# Patient Record
Sex: FEMALE | Race: WHITE | NOT HISPANIC OR LATINO | Employment: FULL TIME | URBAN - METROPOLITAN AREA
[De-identification: names, ages, dates, MRNs, and addresses within clinical notes are randomized per-mention and may not be internally consistent; named-entity substitution may affect disease eponyms.]

---

## 2016-12-31 RX ORDER — METHOCARBAMOL 750 MG/1
TABLET, FILM COATED ORAL
Refills: 0 | COMMUNITY
Start: 2016-12-29 | End: 2017-06-28

## 2016-12-31 RX ORDER — OXYCODONE AND ACETAMINOPHEN 10; 325 MG/1; MG/1
TABLET ORAL
Refills: 0 | COMMUNITY
Start: 2016-10-01 | End: 2017-01-11

## 2017-01-11 ENCOUNTER — LAB (OUTPATIENT)
Dept: LAB | Facility: HOSPITAL | Age: 42
End: 2017-01-11

## 2017-01-11 ENCOUNTER — OFFICE VISIT (OUTPATIENT)
Dept: ONCOLOGY | Facility: CLINIC | Age: 42
End: 2017-01-11

## 2017-01-11 VITALS
HEIGHT: 65 IN | SYSTOLIC BLOOD PRESSURE: 124 MMHG | DIASTOLIC BLOOD PRESSURE: 86 MMHG | BODY MASS INDEX: 26.33 KG/M2 | OXYGEN SATURATION: 100 % | RESPIRATION RATE: 12 BRPM | WEIGHT: 158 LBS | HEART RATE: 72 BPM | TEMPERATURE: 98.8 F

## 2017-01-11 DIAGNOSIS — G25.81 RESTLESS LEG SYNDROME: ICD-10-CM

## 2017-01-11 DIAGNOSIS — D50.0 IRON DEFICIENCY ANEMIA DUE TO CHRONIC BLOOD LOSS: ICD-10-CM

## 2017-01-11 DIAGNOSIS — Z85.3 HISTORY OF RIGHT BREAST CANCER: Primary | ICD-10-CM

## 2017-01-11 DIAGNOSIS — D50.9 IRON DEFICIENCY ANEMIA, UNSPECIFIED IRON DEFICIENCY ANEMIA TYPE: Primary | ICD-10-CM

## 2017-01-11 DIAGNOSIS — G62.0 NEUROPATHY DUE TO CHEMOTHERAPEUTIC DRUG (HCC): ICD-10-CM

## 2017-01-11 DIAGNOSIS — T45.1X5A NEUROPATHY DUE TO CHEMOTHERAPEUTIC DRUG (HCC): ICD-10-CM

## 2017-01-11 LAB
BASOPHILS # BLD AUTO: 0.04 10*3/MM3 (ref 0–0.1)
BASOPHILS NFR BLD AUTO: 0.9 % (ref 0–1.1)
DEPRECATED RDW RBC AUTO: 37.3 FL (ref 37–49)
EOSINOPHIL # BLD AUTO: 0.1 10*3/MM3 (ref 0–0.36)
EOSINOPHIL NFR BLD AUTO: 2.2 % (ref 1–5)
ERYTHROCYTE [DISTWIDTH] IN BLOOD BY AUTOMATED COUNT: 11.7 % (ref 11.7–14.5)
FERRITIN SERPL-MCNC: 430.1 NG/ML (ref 11–207)
HCT VFR BLD AUTO: 33.1 % (ref 34–45)
HGB BLD-MCNC: 10.9 G/DL (ref 11.5–14.9)
IMM GRANULOCYTES # BLD: 0.01 10*3/MM3 (ref 0–0.03)
IMM GRANULOCYTES NFR BLD: 0.2 % (ref 0–0.5)
IRON 24H UR-MRATE: 53 MCG/DL (ref 37–145)
IRON SATN MFR SERPL: 18 % (ref 14–48)
LYMPHOCYTES # BLD AUTO: 1.99 10*3/MM3 (ref 1–3.5)
LYMPHOCYTES NFR BLD AUTO: 43.3 % (ref 20–49)
MCH RBC QN AUTO: 29.3 PG (ref 27–33)
MCHC RBC AUTO-ENTMCNC: 32.9 G/DL (ref 32–35)
MCV RBC AUTO: 89 FL (ref 83–97)
MONOCYTES # BLD AUTO: 0.32 10*3/MM3 (ref 0.25–0.8)
MONOCYTES NFR BLD AUTO: 7 % (ref 4–12)
NEUTROPHILS # BLD AUTO: 2.14 10*3/MM3 (ref 1.5–7)
NEUTROPHILS NFR BLD AUTO: 46.4 % (ref 39–75)
NRBC BLD MANUAL-RTO: 0 /100 WBC (ref 0–0)
PLATELET # BLD AUTO: 187 10*3/MM3 (ref 150–375)
PMV BLD AUTO: 9.5 FL (ref 8.9–12.1)
RBC # BLD AUTO: 3.72 10*6/MM3 (ref 3.9–5)
TIBC SERPL-MCNC: 297 MCG/DL (ref 249–505)
TRANSFERRIN SERPL-MCNC: 212 MG/DL (ref 200–360)
WBC NRBC COR # BLD: 4.6 10*3/MM3 (ref 4–10)

## 2017-01-11 PROCEDURE — 85025 COMPLETE CBC W/AUTO DIFF WBC: CPT | Performed by: INTERNAL MEDICINE

## 2017-01-11 PROCEDURE — 99214 OFFICE O/P EST MOD 30 MIN: CPT | Performed by: INTERNAL MEDICINE

## 2017-01-11 PROCEDURE — 84466 ASSAY OF TRANSFERRIN: CPT | Performed by: INTERNAL MEDICINE

## 2017-01-11 PROCEDURE — 83540 ASSAY OF IRON: CPT | Performed by: INTERNAL MEDICINE

## 2017-01-11 PROCEDURE — 82728 ASSAY OF FERRITIN: CPT | Performed by: INTERNAL MEDICINE

## 2017-01-11 PROCEDURE — 36415 COLL VENOUS BLD VENIPUNCTURE: CPT | Performed by: INTERNAL MEDICINE

## 2017-01-11 NOTE — PROGRESS NOTES
REASONS FOR FOLLOWUP:      1.    History of stage IIB right breast cancer, T2 N1a M0, status post mastectomy and TRAM flap reconstruction. Left prophylactic mastectomy and implant. The patient completed dose-dense Adriamycin/Cytoxan x4, Taxol every 3 x4 in February 2012.  Tamoxifen was i  n  itiated in March 2012.    2. Peripheral neuropathy, sensory, non-motor associated with restless leg syndrom  e and triggered by Taxol treatment. The patient has failed multiple adjuvant therapies for this including Neurontin, Cymbalta and Lyrica. The patient has discontinued Lortab realizing that she is dependent on this medicine and has moved to tramadol 50 mg   twice a day. She has significant restless legs at night according to the patient’  s . Under the present circumstances we measured a B12, folic acid level and a ferritin level because both iron deficiency and B12 deficiency can trigger restless leg syn  drome. The patient will be informed about these reports once they become available. The patient is aware that she continue followup with us every 3 months and doing urine drug screen every 3 months to continue prescription of narcotics. Under no circumsta  nces Lortab will be given to her.     3.   Anemia. The patient has iron deficiency anemia that has been corrected with IV Venofer with 3 Hemoccult's that were negative. The patient’s hemoglobin as per 11/30/2015 has finally improved. Her ferritin level and iron lev  el have normalized. These have no impact whatsoever in her restless leg syndrome, meaning that this condition per se is genetic and not related to iron deficiency. We will continue monitoring her ferritin and iron profile in 2 months and 6 months.     4. Chronic   narcotic use to control restless legs and neuropathic pain.          HISTORY OF PRESENT ILLNESS:  Ms. Griffin returns today to the office for followup.  In the meantime the patient has undergone treatment for pneumonia that  treated her very badly. She had profuse cough, purulent sputum production, shortness of breath, profound fatigue, and unable to work for almost 2 weeks. She continues seeing Dr. Anthony Baez for her neuropathic pain in her lower extremities. He has suggested a neurostimulator. She is not willing to pursue this at this time. She continues receiving tramadol through his office. She remains on her tamoxifen. She has no other symptoms that would suggest breast cancer recurrence. She feels well.               PAST MEDICAL HISTORY:  Rather unremarkable.  She did have a history of right ovarian abscess in 2003 for which she underwent laparoscopic right oophorectomy and salpingectomy.          Otherwise past surgeries include double mastectomy, 07/27/2011, with reconstruction. Status post C-sections x2; one in 1992 and one in 1998.         The patient underwent robotic surgery by Dr. Jermaine Su in June 2013 given the development of abdominal and pelvic pain.  She had a hemorrhagic cyst in   the left ovary that was removed.  She also had bilateral oophorectomy and hysterectomy, not finding any malignancy in the surgical specimens of the uterus, cervix, fallopian tubes, or ovaries.  Given these facts and given the fact as well that the patien  t   has recovered performance status, on 09/20/2013 we advised her to proceed with therapy with Femara at the dose of 2.5 mg a day instead of Tamoxifen.  The patient was advised to initiate a program of walking exercise to minimize bone loss and advised her   to initiate calcium and vitamin D supplementation.  We requested a DEXA scan as well.          OB/GYN HISTORY:  Menarche age 11.  LMP 08/01/11.  She has 7 days of flow which is normal.  She took BCPs briefly several years ago but none recently.  P3, G3.          ONCOLOGIC-HEMATOLOGIC HISTORY: History from previous dates can be found in a separate document.         After being intolerant to oral iron preparation and having a low  iron and low ferritin level the patient received Venofer with improvement in the hemoglobin, iron a  nd TIBC. The patient had normal B12 and normal folic acid levels. Two Hemoccult's were negative for blood.         On 11/30/2015 she was advised to remain no her tamoxifen. She was advised to remain on her narcotic medication at the same dosing to control her neuropathic pain in her feet as a consequence of the previous chemotherapy.         MEDICATIONS: The current medication list was reviewed with the patient and updated in the EMR this date per the Medical Assistant.  Medication dosages and frequencies were confirmed to be accurate.         ALLERGIES:  No known allergies.         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  Denies tobacco or alcohol.  Denies illicit drug use.          FAMILY HISTORY:  Negative for breast or ovarian cancers, negative for other malignancies.  Mother has hypertension.  As of 09/18/2015 the patient’s son, who is a very avid athlete,   16-years-old, has developed atrial fibrillation and has required ablation by the group of pediatric cardiologists at the [facility].         REVIEW OF SYSTEMS:       PAIN:   See VITAL SIGNS below.    GENERAL:    No change in appetite or weight, no fevers, chills or sweats.     SKIN: No rashes or nonhealing lesions.     HEME/LYMPH:  No anemia, easy bruising, bleeding or swollen nodes.     EYES:  No vision changes or diplopia.    ENT:  No tinnitus, hearing loss, gum bleeding, epistaxis, hoarseness or dysphagia.    RESPIRATORY:  No cough, shortness of breath, hemoptysis or wheezing.    CVS:  Occasional palpitations, no orthopnea, dyspnea on exertion or PND.    GI:  No abdominal pain, nausea, vomiting, constipation, diarrhea, melena or hematochezia.     : No dysuria or hematuria.  No abnormal vaginal discharge or bleeding.     MUSCULOSKELETAL:  See HPI.     NEUROLOGICAL: See HPI.     PSYCHIATRIC:  No increased  "nervousness, mood changes or depression.        VITAL SIGNS:    Vitals:    17 1637   BP: 124/86   Pulse: 72   Resp: 12   Temp: 98.8 °F (37.1 °C)   TempSrc: Oral   SpO2: 100%   Weight: 158 lb (71.7 kg)   Height: 64.96\" (165 cm)       PAIN:  3 out of 10      ECO        PHYSICAL EXAMINATION:    GENERAL:  White female in no acute distress.     SKIN:  Warm, dry without rashes, purpura or petechiae.    HEAD:  Normocephalic.    EYES:  Pupils equal, round and reactive to light.  EOMs intact.  Conjunctivae normal.    EARS:  Hearing intact.    NOSE:  Septum midline.  No excoriations or nasal discharge.    MOUTH:  Tongue is well-papillated; no stomatitis or ulcers.  Lips normal.    THROAT:  Oropharynx without lesions or exudates.    NECK:  Supple with good range of motion; no thyromegaly or masses, no JVD or bruits.    LYMPHATICS:  No cervical, supraclavicular, axillary or inguinal adenopathy.    CHEST:  Lungs clear to percussion and auscultation.    CARDIAC:  Regular rate and rhythm without murmurs, rubs or gallops.    ABDOMEN:  Soft, nontender with no organomegaly or masses.    SPINE: Minimal tenderness in the paravertebral muscles in thoracic spine and no soft tissue mass or deformity. No kyphosis.     EXTREMITIES:  No clubbing, cyanosis or edema.    NEUROLOGICAL:  No focal neurological deficits.        LABORATORY DATA:   Auto Differential   Order: [id number] - Part of Panel Order [id number]   Status:  Final result   Visible to patient:  No (Not Released) Dx:  Iron deficiency anemia, unspecified i...      Ref Range & Units 4:28 PM     WBC 4.00 - 10.00 10*3/mm3 4.60   RBC 3.90 - 5.00 10*6/mm3 3.72 (L)   Hemoglobin 11.5 - 14.9 g/dL 10.9 (L)   Hematocrit 34.0 - 45.0 % 33.1 (L)   MCV 83.0 - 97.0 fL 89.0   MCH 27.0 - 33.0 pg 29.3   MCHC 32.0 - 35.0 g/dL 32.9   RDW 11.7 - 14.5 % 11.7   RDW-SD 37.0 - 49.0 fl 37.3   MPV 8.9 - 12.1 fL 9.5   Platelets 150 - 375 10*3/mm3 187   Neutrophil % 39.0 - 75.0 % 46.4   Lymphocyte % 20.0 " - 49.0 % 43.3   Monocyte % 4.0 - 12.0 % 7.0   Eosinophil % 1.0 - 5.0 % 2.2   Basophil % 0.0 - 1.1 % 0.9   Immature Grans % 0.0 - 0.5 % 0.2   Neutrophils, Absolute 1.50 - 7.00 10*3/mm3 2.14   Lymphocytes, Absolute 1.00 - 3.50 10*3/mm3 1.99   Monocytes, Absolute 0.25 - 0.80 10*3/mm3 0.32   Eosinophils, Absolute 0.00 - 0.36 10*3/mm3 0.10   Basophils, Absolute 0.00 - 0.10 10*3/mm3 0.04   Immature Grans, Absolute 0.00 - 0.03 10*3/mm3 0.01   nRBC 0.0 - 0.0 /100 WBC 0.0   Resulting Agency   [facility]      Specimen Collected: 01/11/17  4:28 PM Last Resulted: 01/11/17  4:38 PM                        ASSESSMENT: 1. History of stage IIB right breast cancer, T2 N1a M0, status post mastectomy and TRAM flap reconstruction. Left prophylactic mastectomy and implant. The patient completed dose-dense Adriamycin/Cytoxan x4, Taxol every 3 x4 in February 2012.  Tamoxifen was i  n  itiated in March 2012. So far I find no evidence of cancer recurrence in this patient by clinical examination. Her breast implants remain cosmetically perfect and physically unchanged with no axillary adenopathies or lymphedema in upper extremities. The patient has tolerated tamoxifen well with minimal hot flashes and I find no need for any other intervention from this point of view besides the continuation of tamoxifen 20 mg a day. I find no need for mammograms. The patient will return for this in 6 months.   Peripheral neuropathy, sensory, non-motor associated with restless leg syndrom  e and triggered by Taxol treatment. The patient has failed multiple adjuvant therapies for this including Neurontin, Cymbalta and Lyrica. The patient has discontinued Lortab realizing that she is dependent on this medicine and has moved to tramadol 50 mg   twice a day. She has significant restless legs at night according to the patient’  s . Under the present circumstances we measured a B12, folic acid level and a ferritin level because both iron deficiency and B12  deficiency can trigger restless leg syn  drome. The patient will be informed about these reports once they become available. The patient is aware that she continue followup with us every 3 months and doing urine drug screen every 3 months to continue prescription of narcotics. Under no circumsta  nces Lortab will be given to her.  We advised the patient through multiple narcotic medications and multiple attempts with different medicines including Lyrica, Cymbalta and Neurontin to be seen by Dr. Anthony Baez. He has suggested a neurostimulator implantation. The patient is dubious about the benefit of this and she does not want to proceed in this way. Dr. Baez is providing for her tramadol at this time. CBC is not providing medicine for her under these circumstances.     Anemia. The patient has iron deficiency anemia that has been corrected with IV Venofer with 3 Hemoccult's that were negative. The patient’s hemoglobin as per 11/30/2015 has finally improved. Her ferritin level and iron lev  el have normalized. These have no impact whatsoever in her restless leg syndrome, meaning that this condition per se is genetic and not related to iron deficiency.  On 01/11/2017, the patient has gone through an episode of pneumonia recently and has been treated and now improving. The anemia could be a reflection of that and she has some peripheral lymphocytosis as stated above in the differential of the white count that could be a reflection of the same process. I would like for her to have a CBC repeated in 6 weeks with RN check and otherwise I will review her back in 6 months with a CBC and a CMP. If in 6 weeks from now the anemia persists, will need to further investigate again iron deficiency. The patient is not drinking alcohol. She takes 1 Aleve a day. She has no gastrointestinal symptomatology and she denies any GI bleeding. She has no menstrual flow.

## 2017-02-22 ENCOUNTER — LAB (OUTPATIENT)
Dept: LAB | Facility: HOSPITAL | Age: 42
End: 2017-02-22

## 2017-02-22 ENCOUNTER — CLINICAL SUPPORT (OUTPATIENT)
Dept: ONCOLOGY | Facility: HOSPITAL | Age: 42
End: 2017-02-22

## 2017-02-22 DIAGNOSIS — Z85.3 HISTORY OF RIGHT BREAST CANCER: ICD-10-CM

## 2017-02-22 DIAGNOSIS — C50.911 MALIGNANT NEOPLASM OF RIGHT FEMALE BREAST, UNSPECIFIED SITE OF BREAST: Primary | ICD-10-CM

## 2017-02-22 DIAGNOSIS — D50.0 IRON DEFICIENCY ANEMIA DUE TO CHRONIC BLOOD LOSS: ICD-10-CM

## 2017-02-22 LAB
ALBUMIN SERPL-MCNC: 4.4 G/DL (ref 3.5–5.2)
ALBUMIN/GLOB SERPL: 1.6 G/DL (ref 1.1–2.4)
ALP SERPL-CCNC: 47 U/L (ref 38–116)
ALT SERPL W P-5'-P-CCNC: 10 U/L (ref 0–33)
ANION GAP SERPL CALCULATED.3IONS-SCNC: 14.3 MMOL/L
AST SERPL-CCNC: 17 U/L (ref 0–32)
BASOPHILS # BLD AUTO: 0.06 10*3/MM3 (ref 0–0.1)
BASOPHILS NFR BLD AUTO: 0.8 % (ref 0–1.1)
BILIRUB SERPL-MCNC: 0.2 MG/DL (ref 0.1–1.2)
BUN BLD-MCNC: 14 MG/DL (ref 6–20)
BUN/CREAT SERPL: 13.6 (ref 7.3–30)
CALCIUM SPEC-SCNC: 9.3 MG/DL (ref 8.5–10.2)
CHLORIDE SERPL-SCNC: 99 MMOL/L (ref 98–107)
CO2 SERPL-SCNC: 25.7 MMOL/L (ref 22–29)
CREAT BLD-MCNC: 1.03 MG/DL (ref 0.6–1.1)
DEPRECATED RDW RBC AUTO: 37.3 FL (ref 37–49)
EOSINOPHIL # BLD AUTO: 0.05 10*3/MM3 (ref 0–0.36)
EOSINOPHIL NFR BLD AUTO: 0.7 % (ref 1–5)
ERYTHROCYTE [DISTWIDTH] IN BLOOD BY AUTOMATED COUNT: 12.3 % (ref 11.7–14.5)
GFR SERPL CREATININE-BSD FRML MDRD: 58 ML/MIN/1.73
GLOBULIN UR ELPH-MCNC: 2.7 GM/DL (ref 1.8–3.5)
GLUCOSE BLD-MCNC: 94 MG/DL (ref 74–124)
HCT VFR BLD AUTO: 36.9 % (ref 34–45)
HGB BLD-MCNC: 12.7 G/DL (ref 11.5–14.9)
IMM GRANULOCYTES # BLD: 0.04 10*3/MM3 (ref 0–0.03)
IMM GRANULOCYTES NFR BLD: 0.5 % (ref 0–0.5)
LYMPHOCYTES # BLD AUTO: 2.42 10*3/MM3 (ref 1–3.5)
LYMPHOCYTES NFR BLD AUTO: 32.8 % (ref 20–49)
MCH RBC QN AUTO: 29.1 PG (ref 27–33)
MCHC RBC AUTO-ENTMCNC: 34.4 G/DL (ref 32–35)
MCV RBC AUTO: 84.4 FL (ref 83–97)
MONOCYTES # BLD AUTO: 0.42 10*3/MM3 (ref 0.25–0.8)
MONOCYTES NFR BLD AUTO: 5.7 % (ref 4–12)
NEUTROPHILS # BLD AUTO: 4.39 10*3/MM3 (ref 1.5–7)
NEUTROPHILS NFR BLD AUTO: 59.5 % (ref 39–75)
NRBC BLD MANUAL-RTO: 0 /100 WBC (ref 0–0)
PLATELET # BLD AUTO: 175 10*3/MM3 (ref 150–375)
PMV BLD AUTO: 11 FL (ref 8.9–12.1)
POTASSIUM BLD-SCNC: 4.3 MMOL/L (ref 3.5–4.7)
PROT SERPL-MCNC: 7.1 G/DL (ref 6.3–8)
RBC # BLD AUTO: 4.37 10*6/MM3 (ref 3.9–5)
SODIUM BLD-SCNC: 139 MMOL/L (ref 134–145)
WBC NRBC COR # BLD: 7.38 10*3/MM3 (ref 4–10)

## 2017-02-22 PROCEDURE — 36415 COLL VENOUS BLD VENIPUNCTURE: CPT

## 2017-02-22 PROCEDURE — 85025 COMPLETE CBC W/AUTO DIFF WBC: CPT

## 2017-02-22 PROCEDURE — 36416 COLLJ CAPILLARY BLOOD SPEC: CPT

## 2017-02-22 PROCEDURE — 80053 COMPREHEN METABOLIC PANEL: CPT

## 2017-02-22 RX ORDER — TAMOXIFEN CITRATE 20 MG/1
TABLET ORAL
Qty: 30 TABLET | Refills: 3 | Status: SHIPPED | OUTPATIENT
Start: 2017-02-22 | End: 2017-06-26 | Stop reason: SDUPTHER

## 2017-02-22 NOTE — PROGRESS NOTES
Pt here today for CBC and RN review.  CBC reviewed with pt and copy given to pt.    Hgb today 12.7 which has improved from her last visit on 1/11 when it was 10.9.  Ptls 175, ANC 4.39.  CBC today stable.    Reviewed MD notes from 1/11/17.  Pt stated that in the last week she has been feeling fatigued, nauseated, twitching in arms and legs at times, big toe on each foot has a bruised appearance.    Called Dr Munoz and reviewed information with MD.  Orders received for CMP.  Pt instructed to make appt with primary care MD also.  CMP drawn and explained to pt that results would be back tomorrow and to call back to have results reviewed with pt.  Pt verbalized understanding.

## 2017-02-23 ENCOUNTER — TELEPHONE (OUTPATIENT)
Dept: ONCOLOGY | Facility: HOSPITAL | Age: 42
End: 2017-02-23

## 2017-02-23 NOTE — TELEPHONE ENCOUNTER
----- Message from Megsummer DAMIAN Lares sent at 2/23/2017  1:06 PM EST -----   Pt is calling to get her lab results      [phone]    Reviewed CMP with pt. Informed pt to f/u with PCP per Dr. Munoz instructions. Pt v/u

## 2017-05-23 ENCOUNTER — HOSPITAL ENCOUNTER (OUTPATIENT)
Dept: GENERAL RADIOLOGY | Facility: HOSPITAL | Age: 42
Discharge: HOME | End: 2017-05-23

## 2017-05-23 ENCOUNTER — HOSPITAL ENCOUNTER (OUTPATIENT)
Dept: GENERAL RADIOLOGY | Facility: HOSPITAL | Age: 42
Discharge: HOME | End: 2017-05-23
Admitting: FAMILY MEDICINE

## 2017-05-23 DIAGNOSIS — R60.9 SWELLING: ICD-10-CM

## 2017-05-23 DIAGNOSIS — S90.32XA CONTUSION OF LEFT FOOT: ICD-10-CM

## 2017-05-23 DIAGNOSIS — R52 PAIN: ICD-10-CM

## 2017-05-23 PROCEDURE — 73610 X-RAY EXAM OF ANKLE: CPT

## 2017-05-23 PROCEDURE — 73630 X-RAY EXAM OF FOOT: CPT

## 2017-06-26 RX ORDER — TAMOXIFEN CITRATE 20 MG/1
TABLET ORAL
Qty: 30 TABLET | Refills: 0 | Status: SHIPPED | OUTPATIENT
Start: 2017-06-26 | End: 2017-07-25 | Stop reason: SDUPTHER

## 2017-06-28 ENCOUNTER — LAB (OUTPATIENT)
Dept: LAB | Facility: HOSPITAL | Age: 42
End: 2017-06-28

## 2017-06-28 ENCOUNTER — OFFICE VISIT (OUTPATIENT)
Dept: ONCOLOGY | Facility: CLINIC | Age: 42
End: 2017-06-28

## 2017-06-28 VITALS
DIASTOLIC BLOOD PRESSURE: 78 MMHG | HEART RATE: 84 BPM | SYSTOLIC BLOOD PRESSURE: 138 MMHG | WEIGHT: 151.6 LBS | RESPIRATION RATE: 14 BRPM | HEIGHT: 65 IN | BODY MASS INDEX: 25.26 KG/M2 | TEMPERATURE: 98.2 F | OXYGEN SATURATION: 97 %

## 2017-06-28 DIAGNOSIS — G25.81 RESTLESS LEG SYNDROME: ICD-10-CM

## 2017-06-28 DIAGNOSIS — G62.0 NEUROPATHY DUE TO CHEMOTHERAPEUTIC DRUG (HCC): ICD-10-CM

## 2017-06-28 DIAGNOSIS — D50.0 IRON DEFICIENCY ANEMIA DUE TO CHRONIC BLOOD LOSS: ICD-10-CM

## 2017-06-28 DIAGNOSIS — Z85.3 HISTORY OF RIGHT BREAST CANCER: Primary | ICD-10-CM

## 2017-06-28 DIAGNOSIS — T45.1X5A NEUROPATHY DUE TO CHEMOTHERAPEUTIC DRUG (HCC): ICD-10-CM

## 2017-06-28 LAB
ALBUMIN SERPL-MCNC: 4.4 G/DL (ref 3.5–5.2)
ALBUMIN/GLOB SERPL: 1.4 G/DL (ref 1.1–2.4)
ALP SERPL-CCNC: 64 U/L (ref 38–116)
ALT SERPL W P-5'-P-CCNC: 16 U/L (ref 0–33)
ANION GAP SERPL CALCULATED.3IONS-SCNC: 14.8 MMOL/L
AST SERPL-CCNC: 20 U/L (ref 0–32)
BASOPHILS # BLD AUTO: 0.04 10*3/MM3 (ref 0–0.1)
BASOPHILS NFR BLD AUTO: 0.6 % (ref 0–1.1)
BILIRUB SERPL-MCNC: 0.2 MG/DL (ref 0.1–1.2)
BUN BLD-MCNC: 18 MG/DL (ref 6–20)
BUN/CREAT SERPL: 18.2 (ref 7.3–30)
CALCIUM SPEC-SCNC: 9.3 MG/DL (ref 8.5–10.2)
CHLORIDE SERPL-SCNC: 97 MMOL/L (ref 98–107)
CO2 SERPL-SCNC: 26.2 MMOL/L (ref 22–29)
CREAT BLD-MCNC: 0.99 MG/DL (ref 0.6–1.1)
DEPRECATED RDW RBC AUTO: 39.8 FL (ref 37–49)
EOSINOPHIL # BLD AUTO: 0.11 10*3/MM3 (ref 0–0.36)
EOSINOPHIL NFR BLD AUTO: 1.6 % (ref 1–5)
ERYTHROCYTE [DISTWIDTH] IN BLOOD BY AUTOMATED COUNT: 12.3 % (ref 11.7–14.5)
GFR SERPL CREATININE-BSD FRML MDRD: 61 ML/MIN/1.73
GLOBULIN UR ELPH-MCNC: 3.2 GM/DL (ref 1.8–3.5)
GLUCOSE BLD-MCNC: 91 MG/DL (ref 74–124)
HCT VFR BLD AUTO: 37.1 % (ref 34–45)
HGB BLD-MCNC: 12.3 G/DL (ref 11.5–14.9)
HOLD SPECIMEN: NORMAL
IMM GRANULOCYTES # BLD: 0.02 10*3/MM3 (ref 0–0.03)
IMM GRANULOCYTES NFR BLD: 0.3 % (ref 0–0.5)
LYMPHOCYTES # BLD AUTO: 2.39 10*3/MM3 (ref 1–3.5)
LYMPHOCYTES NFR BLD AUTO: 34.6 % (ref 20–49)
MCH RBC QN AUTO: 29.4 PG (ref 27–33)
MCHC RBC AUTO-ENTMCNC: 33.2 G/DL (ref 32–35)
MCV RBC AUTO: 88.5 FL (ref 83–97)
MONOCYTES # BLD AUTO: 0.29 10*3/MM3 (ref 0.25–0.8)
MONOCYTES NFR BLD AUTO: 4.2 % (ref 4–12)
NEUTROPHILS # BLD AUTO: 4.05 10*3/MM3 (ref 1.5–7)
NEUTROPHILS NFR BLD AUTO: 58.7 % (ref 39–75)
NRBC BLD MANUAL-RTO: 0 /100 WBC (ref 0–0)
PLATELET # BLD AUTO: 234 10*3/MM3 (ref 150–375)
PMV BLD AUTO: 9.3 FL (ref 8.9–12.1)
POTASSIUM BLD-SCNC: 4.2 MMOL/L (ref 3.5–4.7)
PROT SERPL-MCNC: 7.6 G/DL (ref 6.3–8)
RBC # BLD AUTO: 4.19 10*6/MM3 (ref 3.9–5)
SODIUM BLD-SCNC: 138 MMOL/L (ref 134–145)
WBC NRBC COR # BLD: 6.9 10*3/MM3 (ref 4–10)

## 2017-06-28 PROCEDURE — 36415 COLL VENOUS BLD VENIPUNCTURE: CPT | Performed by: INTERNAL MEDICINE

## 2017-06-28 PROCEDURE — 80053 COMPREHEN METABOLIC PANEL: CPT | Performed by: INTERNAL MEDICINE

## 2017-06-28 PROCEDURE — 99213 OFFICE O/P EST LOW 20 MIN: CPT | Performed by: INTERNAL MEDICINE

## 2017-06-28 PROCEDURE — 85025 COMPLETE CBC W/AUTO DIFF WBC: CPT | Performed by: INTERNAL MEDICINE

## 2017-06-28 RX ORDER — OXYCODONE AND ACETAMINOPHEN 10; 325 MG/1; MG/1
TABLET ORAL
Refills: 0 | COMMUNITY
Start: 2017-05-30 | End: 2019-02-14

## 2017-06-28 NOTE — PROGRESS NOTES
REASONS FOR FOLLOWUP:      1.    History of stage IIB right breast cancer, T2 N1a M0, status post mastectomy and TRAM flap reconstruction. Left prophylactic mastectomy and implant. The patient completed dose-dense Adriamycin/Cytoxan x4, Taxol every 3 x4 in February 2012.  Tamoxifen was i  n  itiated in March 2012.    2. Peripheral neuropathy, sensory, non-motor associated with restless leg syndrom  e and triggered by Taxol treatment. The patient has failed multiple adjuvant therapies for this including Neurontin, Cymbalta and Lyrica. The patient has discontinued Lortab realizing that she is dependent on this medicine and has moved to tramadol 50 mg   twice a day. She has significant restless legs at night according to the patient’  s . Under the present circumstances we measured a B12, folic acid level and a ferritin level because both iron deficiency and B12 deficiency can trigger restless leg syn  drome. The patient will be informed about these reports once they become available. The patient is aware that she continue followup with us every 3 months and doing urine drug screen every 3 months to continue prescription of narcotics. Under no circumsta  nces Lortab will be given to her.     3.   Anemia. The patient has iron deficiency anemia that has been corrected with IV Venofer with 3 Hemoccult's that were negative. The patient’s hemoglobin as per 11/30/2015 has finally improved. Her ferritin level and iron lev  el have normalized. These have no impact whatsoever in her restless leg syndrome, meaning that this condition per se is genetic and not related to iron deficiency. We will continue monitoring her ferritin and iron profile in 2 months and 6 months.     4. Chronic   narcotic use to control restless legs and neuropathic pain.          HISTORY OF PRESENT ILLNESS:  Ms. Griffin returns today to the office for followup. . She continues seeing Dr. Anthony Baez for her neuropathic pain in her  lower extremities. He has suggested a neurostimulator. She is not willing to pursue this at this time. She continues receiving tramadol through his office. She remains on her tamoxifen. She has no other symptoms that would suggest breast cancer recurrence. She feels well.               PAST MEDICAL HISTORY:  Rather unremarkable.  She did have a history of right ovarian abscess in 2003 for which she underwent laparoscopic right oophorectomy and salpingectomy.          Otherwise past surgeries include double mastectomy, 07/27/2011, with reconstruction. Status post C-sections x2; one in 1992 and one in 1998.         The patient underwent robotic surgery by Dr. Jermaine Su in June 2013 given the development of abdominal and pelvic pain.  She had a hemorrhagic cyst in   the left ovary that was removed.  She also had bilateral oophorectomy and hysterectomy, not finding any malignancy in the surgical specimens of the uterus, cervix, fallopian tubes, or ovaries.  Given these facts and given the fact as well that the patien  t   has recovered performance status, on 09/20/2013 we advised her to proceed with therapy with Femara at the dose of 2.5 mg a day instead of Tamoxifen.  The patient was advised to initiate a program of walking exercise to minimize bone loss and advised her   to initiate calcium and vitamin D supplementation.  We requested a DEXA scan as well.          OB/GYN HISTORY:  Menarche age 11.  LMP 08/01/11.  She has 7 days of flow which is normal.  She took BCPs briefly several years ago but none recently.  P3, G3.          ONCOLOGIC-HEMATOLOGIC HISTORY: History from previous dates can be found in a separate document.         After being intolerant to oral iron preparation and having a low iron and low ferritin level the patient received Venofer with improvement in the hemoglobin, iron a  nd TIBC. The patient had normal B12 and normal folic acid levels. Two Hemoccult's were negative for blood.         On  "11/30/2015 she was advised to remain no her tamoxifen. She was advised to remain on her narcotic medication at the same dosing to control her neuropathic pain in her feet as a consequence of the previous chemotherapy.         MEDICATIONS: The current medication list was reviewed with the patient and updated in the EMR this date per the Medical Assistant.  Medication dosages and frequencies were confirmed to be accurate.         ALLERGIES:  No known allergies.         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  Denies illicit drug use.          FAMILY HISTORY:  Negative for breast or ovarian cancers, negative for other malignancies.  Mother has hypertension.  As of 09/18/2015 the patient’s son, who is a very avid athlete,   16-years-old, has developed atrial fibrillation and has required ablation by the group of pediatric cardiologists at the [facility].         REVIEW OF SYSTEMS:       PAIN:   See VITAL SIGNS below.    GENERAL:    No change in appetite or weight, no fevers, chills or sweats.     SKIN: No rashes or nonhealing lesions.     HEME/LYMPH:  No anemia, easy bruising, bleeding or swollen nodes.     EYES:  No vision changes or diplopia.    ENT:  No tinnitus, hearing loss, gum bleeding, epistaxis, hoarseness or dysphagia.    RESPIRATORY:  No cough, shortness of breath, hemoptysis or wheezing.    CVS:  Occasional palpitations, no orthopnea, dyspnea on exertion or PND.    GI:  No abdominal pain, nausea, vomiting, constipation, diarrhea, melena or hematochezia.     : No dysuria or hematuria.  No abnormal vaginal discharge or bleeding.     MUSCULOSKELETAL: negative    NEUROLOGICAL: See HPI.     PSYCHIATRIC:  No increased nervousness, mood changes or depression.        VITAL SIGNS:    Vitals:    06/28/17 1625   BP: 138/78   Pulse: 84   Resp: 14   Temp: 98.2 °F (36.8 °C)   SpO2: 97%   Weight: 151 lb 9.6 oz (68.8 kg)   Height: 64.96\" (165 cm) "       PAIN:  3 out of 10      ECO        PHYSICAL EXAMINATION:    GENERAL:  White female in no acute distress.     SKIN:  Warm, dry without rashes, purpura or petechiae.    HEAD:  Normocephalic.    EYES:  Pupils equal, round and reactive to light.  EOMs intact.  Conjunctivae normal.    EARS:  Hearing intact.    NOSE:  Septum midline.  No excoriations or nasal discharge.    MOUTH:  Tongue is well-papillated; no stomatitis or ulcers.  Lips normal.    THROAT:  Oropharynx without lesions or exudates.    NECK:  Supple with good range of motion; no thyromegaly or masses, no JVD or bruits.    LYMPHATICS:  No cervical, supraclavicular, axillary or inguinal adenopathy.    CHEST:  Lungs clear to percussion and auscultation.    CARDIAC:  Regular rate and rhythm without murmurs, rubs or gallops.    ABDOMEN:  Soft, nontender with no organomegaly or masses.    SPINE: Minimal tenderness in the paravertebral muscles in thoracic spine and no soft tissue mass or deformity. No kyphosis.     EXTREMITIES:  No clubbing, cyanosis or edema.    NEUROLOGICAL:  No focal neurological deficits.  Numbness in toes both le.      LABORATORY DATA:   CBC Auto Differential   Order: [id number] - Part of Panel Order [id number]   Status:  Final result   Visible to patient:  No (Not Released) Dx:  History of right breast cancer; Iron ...      Ref Range & Units 4:13 PM     WBC 4.00 - 10.00 10*3/mm3 6.90   RBC 3.90 - 5.00 10*6/mm3 4.19   Hemoglobin 11.5 - 14.9 g/dL 12.3   Hematocrit 34.0 - 45.0 % 37.1   MCV 83.0 - 97.0 fL 88.5   MCH 27.0 - 33.0 pg 29.4   MCHC 32.0 - 35.0 g/dL 33.2   RDW 11.7 - 14.5 % 12.3   RDW-SD 37.0 - 49.0 fl 39.8   MPV 8.9 - 12.1 fL 9.3   Platelets 150 - 375 10*3/mm3 234   Neutrophil % 39.0 - 75.0 % 58.7   Lymphocyte % 20.0 - 49.0 % 34.6   Monocyte % 4.0 - 12.0 % 4.2   Eosinophil % 1.0 - 5.0 % 1.6   Basophil % 0.0 - 1.1 % 0.6   Immature Grans % 0.0 - 0.5 % 0.3   Neutrophils, Absolute 1.50 - 7.00 10*3/mm3 4.05   Lymphocytes, Absolute  1.00 - 3.50 10*3/mm3 2.39   Monocytes, Absolute 0.25 - 0.80 10*3/mm3 0.29   Eosinophils, Absolute 0.00 - 0.36 10*3/mm3 0.11   Basophils, Absolute 0.00 - 0.10 10*3/mm3 0.04   Immature Grans, Absolute 0.00 - 0.03 10*3/mm3 0.02   nRBC 0.0 - 0.0 /100 WBC 0.0   Resulting Agency   CBC LAB      Specimen Collected: 06/28/17  4:13 PM Last Resulted: 06/28/17  4:20 PM                            ASSESSMENT: 1. History of stage IIB right breast cancer, T2 N1a M0, status post mastectomy and TRAM flap reconstruction. Left prophylactic mastectomy and implant. The patient completed dose-dense Adriamycin/Cytoxan x4, Taxol every 3 x4 in February 2012.  Tamoxifen was i  n  itiated in March 2012. So far I find no evidence of cancer recurrence in this patient by clinical examination. Her breast implants remain cosmetically perfect and physically unchanged with no axillary adenopathies or lymphedema in upper extremities. The patient has tolerated tamoxifen well with minimal hot flashes and I find no need for any other intervention from this point of view besides the continuation of tamoxifen 20 mg a day. I find no need for mammograms. The patient will return for this in 6 months.   2.Peripheral neuropathy, sensory, non-motor associated with restless leg syndrom  e and triggered by Taxol treatment. The patient has failed multiple adjuvant therapies for this including Neurontin, Cymbalta and Lyrica. The patient has discontinued Lortab realizing that she is dependent on this medicine and has moved to tramadol 50 mg   twice a day. She has significant restless legs at night according to the patient’  s . Under the present circumstances we measured a B12, folic acid level and a ferritin level because both iron deficiency and B12 deficiency can trigger restless leg syn  drome.   3.  Anemia. The patient has iron deficiency anemia that has been corrected with IV Venofer with 3 Hemoccult's that were negative. The patient’s hemoglobin as  per today has finally improved. Her ferritin level and iron lev  el have normalized. These have no impact whatsoever in her restless leg syndrome, meaning that this condition per se is genetic and not related to iron deficiency.      I will review her back in 6 months, cbc cmp at that time, no need for colonoscopy or pelvic exams at this point.

## 2017-07-25 RX ORDER — TAMOXIFEN CITRATE 20 MG/1
TABLET ORAL
Qty: 30 TABLET | Refills: 5 | Status: SHIPPED | OUTPATIENT
Start: 2017-07-25 | End: 2018-02-01 | Stop reason: SDUPTHER

## 2017-10-23 ENCOUNTER — TELEPHONE (OUTPATIENT)
Dept: ONCOLOGY | Facility: HOSPITAL | Age: 42
End: 2017-10-23

## 2017-10-23 NOTE — TELEPHONE ENCOUNTER
Pt calling to see if we could prescribe her pain medication since she doesn't see Dr Baez until Friday and they are closed today. Informed patient we can not prescribe narcotics for another MD. Pt v/u

## 2017-12-04 ENCOUNTER — APPOINTMENT (OUTPATIENT)
Dept: ONCOLOGY | Facility: CLINIC | Age: 42
End: 2017-12-04

## 2017-12-04 ENCOUNTER — APPOINTMENT (OUTPATIENT)
Dept: LAB | Facility: HOSPITAL | Age: 42
End: 2017-12-04

## 2018-02-01 RX ORDER — TAMOXIFEN CITRATE 20 MG/1
TABLET ORAL
Qty: 30 TABLET | Refills: 0 | Status: SHIPPED | OUTPATIENT
Start: 2018-02-01 | End: 2018-04-16

## 2018-02-01 NOTE — TELEPHONE ENCOUNTER
Pt missed dec appt with Dr. Munoz. Refill request from pharmacy for tamoxifen. Informed pharmacy pt needs a f/u appt to receive future refills

## 2018-02-02 ENCOUNTER — APPOINTMENT (OUTPATIENT)
Dept: ONCOLOGY | Facility: CLINIC | Age: 43
End: 2018-02-02

## 2018-02-26 ENCOUNTER — APPOINTMENT (OUTPATIENT)
Dept: LAB | Facility: HOSPITAL | Age: 43
End: 2018-02-26

## 2018-02-26 ENCOUNTER — APPOINTMENT (OUTPATIENT)
Dept: ONCOLOGY | Facility: CLINIC | Age: 43
End: 2018-02-26

## 2018-03-12 ENCOUNTER — DOCUMENTATION (OUTPATIENT)
Dept: ONCOLOGY | Facility: CLINIC | Age: 43
End: 2018-03-12

## 2018-03-12 NOTE — PROGRESS NOTES
Fax rec from [facility] pharmacy-they are requesting a new rx for pts Tamoxifen. Per 2/1/18 note from Stephanie Sharpe RN-Pt need to see MD before any refills will be given. Pt has appt on 3/16/18. I have noted this on the fax and returned to [facility] [phone]

## 2018-04-16 ENCOUNTER — APPOINTMENT (OUTPATIENT)
Dept: LAB | Facility: HOSPITAL | Age: 43
End: 2018-04-16

## 2018-04-16 ENCOUNTER — OFFICE VISIT (OUTPATIENT)
Dept: ONCOLOGY | Facility: CLINIC | Age: 43
End: 2018-04-16

## 2018-04-16 VITALS
OXYGEN SATURATION: 100 % | HEIGHT: 65 IN | DIASTOLIC BLOOD PRESSURE: 90 MMHG | TEMPERATURE: 99.1 F | RESPIRATION RATE: 16 BRPM | BODY MASS INDEX: 25.92 KG/M2 | SYSTOLIC BLOOD PRESSURE: 124 MMHG | HEART RATE: 81 BPM | WEIGHT: 155.6 LBS

## 2018-04-16 DIAGNOSIS — G62.0 NEUROPATHY DUE TO CHEMOTHERAPEUTIC DRUG (HCC): ICD-10-CM

## 2018-04-16 DIAGNOSIS — D50.0 IRON DEFICIENCY ANEMIA DUE TO CHRONIC BLOOD LOSS: ICD-10-CM

## 2018-04-16 DIAGNOSIS — G25.81 RESTLESS LEG SYNDROME: ICD-10-CM

## 2018-04-16 DIAGNOSIS — T45.1X5A NEUROPATHY DUE TO CHEMOTHERAPEUTIC DRUG (HCC): ICD-10-CM

## 2018-04-16 DIAGNOSIS — Z85.3 HISTORY OF RIGHT BREAST CANCER: Primary | ICD-10-CM

## 2018-04-16 LAB
ALBUMIN SERPL-MCNC: 4.5 G/DL (ref 3.5–5.2)
ALBUMIN/GLOB SERPL: 1.4 G/DL (ref 1.1–2.4)
ALP SERPL-CCNC: 67 U/L (ref 38–116)
ALT SERPL W P-5'-P-CCNC: 15 U/L (ref 0–33)
ANION GAP SERPL CALCULATED.3IONS-SCNC: 14 MMOL/L
AST SERPL-CCNC: 21 U/L (ref 0–32)
BASOPHILS # BLD AUTO: 0.04 10*3/MM3 (ref 0–0.1)
BASOPHILS NFR BLD AUTO: 0.8 % (ref 0–1.1)
BILIRUB SERPL-MCNC: 0.2 MG/DL (ref 0.1–1.2)
BUN BLD-MCNC: 12 MG/DL (ref 6–20)
BUN/CREAT SERPL: 12.6 (ref 7.3–30)
CALCIUM SPEC-SCNC: 9.7 MG/DL (ref 8.5–10.2)
CHLORIDE SERPL-SCNC: 101 MMOL/L (ref 98–107)
CO2 SERPL-SCNC: 26 MMOL/L (ref 22–29)
CREAT BLD-MCNC: 0.95 MG/DL (ref 0.6–1.1)
DEPRECATED RDW RBC AUTO: 39.7 FL (ref 37–49)
EOSINOPHIL # BLD AUTO: 0.17 10*3/MM3 (ref 0–0.36)
EOSINOPHIL NFR BLD AUTO: 3.2 % (ref 1–5)
ERYTHROCYTE [DISTWIDTH] IN BLOOD BY AUTOMATED COUNT: 12.2 % (ref 11.7–14.5)
FERRITIN SERPL-MCNC: 310.6 NG/ML (ref 11–207)
GFR SERPL CREATININE-BSD FRML MDRD: 64 ML/MIN/1.73
GLOBULIN UR ELPH-MCNC: 3.3 GM/DL (ref 1.8–3.5)
GLUCOSE BLD-MCNC: 100 MG/DL (ref 74–124)
HCT VFR BLD AUTO: 37.1 % (ref 34–45)
HGB BLD-MCNC: 12.4 G/DL (ref 11.5–14.9)
HGB RETIC QN: 34.8 PG (ref 29.8–36.1)
IMM GRANULOCYTES # BLD: 0.02 10*3/MM3 (ref 0–0.03)
IMM GRANULOCYTES NFR BLD: 0.4 % (ref 0–0.5)
IMM RETICS NFR: 6.2 % (ref 3–15.8)
IRON 24H UR-MRATE: 66 MCG/DL (ref 37–145)
IRON SATN MFR SERPL: 18 % (ref 14–48)
LYMPHOCYTES # BLD AUTO: 2.1 10*3/MM3 (ref 1–3.5)
LYMPHOCYTES NFR BLD AUTO: 39.5 % (ref 20–49)
MCH RBC QN AUTO: 29.9 PG (ref 27–33)
MCHC RBC AUTO-ENTMCNC: 33.4 G/DL (ref 32–35)
MCV RBC AUTO: 89.4 FL (ref 83–97)
MONOCYTES # BLD AUTO: 0.31 10*3/MM3 (ref 0.25–0.8)
MONOCYTES NFR BLD AUTO: 5.8 % (ref 4–12)
NEUTROPHILS # BLD AUTO: 2.67 10*3/MM3 (ref 1.5–7)
NEUTROPHILS NFR BLD AUTO: 50.3 % (ref 39–75)
NRBC BLD MANUAL-RTO: 0 /100 WBC (ref 0–0)
PLATELET # BLD AUTO: 205 10*3/MM3 (ref 150–375)
PMV BLD AUTO: 9.8 FL (ref 8.9–12.1)
POTASSIUM BLD-SCNC: 4.3 MMOL/L (ref 3.5–4.7)
PROT SERPL-MCNC: 7.8 G/DL (ref 6.3–8)
RBC # BLD AUTO: 4.15 10*6/MM3 (ref 3.9–5)
RETICS/RBC NFR AUTO: 1.11 % (ref 0.6–2)
SODIUM BLD-SCNC: 141 MMOL/L (ref 134–145)
TIBC SERPL-MCNC: 361 MCG/DL (ref 249–505)
TRANSFERRIN SERPL-MCNC: 258 MG/DL (ref 200–360)
WBC NRBC COR # BLD: 5.31 10*3/MM3 (ref 4–10)

## 2018-04-16 PROCEDURE — 85025 COMPLETE CBC W/AUTO DIFF WBC: CPT | Performed by: INTERNAL MEDICINE

## 2018-04-16 PROCEDURE — 83540 ASSAY OF IRON: CPT | Performed by: INTERNAL MEDICINE

## 2018-04-16 PROCEDURE — 85046 RETICYTE/HGB CONCENTRATE: CPT | Performed by: INTERNAL MEDICINE

## 2018-04-16 PROCEDURE — 82728 ASSAY OF FERRITIN: CPT | Performed by: INTERNAL MEDICINE

## 2018-04-16 PROCEDURE — 84466 ASSAY OF TRANSFERRIN: CPT | Performed by: INTERNAL MEDICINE

## 2018-04-16 PROCEDURE — 99213 OFFICE O/P EST LOW 20 MIN: CPT | Performed by: INTERNAL MEDICINE

## 2018-04-16 PROCEDURE — 80053 COMPREHEN METABOLIC PANEL: CPT | Performed by: INTERNAL MEDICINE

## 2018-04-16 PROCEDURE — 36415 COLL VENOUS BLD VENIPUNCTURE: CPT | Performed by: INTERNAL MEDICINE

## 2018-04-17 ENCOUNTER — TELEPHONE (OUTPATIENT)
Dept: ONCOLOGY | Facility: CLINIC | Age: 43
End: 2018-04-17

## 2018-05-01 ENCOUNTER — HOSPITAL ENCOUNTER (OUTPATIENT)
Dept: GENERAL RADIOLOGY | Facility: HOSPITAL | Age: 43
Discharge: HOME | End: 2018-05-01
Attending: PAIN MEDICINE | Admitting: PAIN MEDICINE

## 2018-05-01 DIAGNOSIS — M25.561 PAIN IN BOTH KNEES, UNSPECIFIED CHRONICITY: ICD-10-CM

## 2018-05-01 DIAGNOSIS — M25.562 PAIN IN BOTH KNEES, UNSPECIFIED CHRONICITY: ICD-10-CM

## 2018-05-01 PROCEDURE — 73560 X-RAY EXAM OF KNEE 1 OR 2: CPT

## 2018-08-14 ENCOUNTER — HOSPITAL ENCOUNTER (OUTPATIENT)
Dept: ULTRASOUND IMAGING | Facility: HOSPITAL | Age: 43
Discharge: HOME | End: 2018-08-14
Admitting: FAMILY MEDICINE

## 2018-08-14 ENCOUNTER — TRANSCRIBE ORDERS (OUTPATIENT)
Dept: ADMINISTRATIVE | Facility: HOSPITAL | Age: 43
End: 2018-08-14

## 2018-08-14 ENCOUNTER — HOSPITAL ENCOUNTER (OUTPATIENT)
Dept: ULTRASOUND IMAGING | Facility: HOSPITAL | Age: 43
Discharge: HOME | End: 2018-08-14

## 2018-08-14 DIAGNOSIS — R10.9 ABDOMINAL PAIN, UNSPECIFIED ABDOMINAL LOCATION: ICD-10-CM

## 2018-08-14 DIAGNOSIS — R10.9 ABDOMINAL PAIN, UNSPECIFIED ABDOMINAL LOCATION: Primary | ICD-10-CM

## 2018-08-14 PROCEDURE — 76830 TRANSVAGINAL US NON-OB: CPT

## 2018-08-14 PROCEDURE — 76700 US EXAM ABDOM COMPLETE: CPT

## 2018-08-14 PROCEDURE — 76857 US EXAM PELVIC LIMITED: CPT

## 2018-10-23 ENCOUNTER — TRANSCRIBE ORDERS (OUTPATIENT)
Dept: ADMINISTRATIVE | Facility: HOSPITAL | Age: 43
End: 2018-10-23

## 2018-10-23 ENCOUNTER — OFFICE VISIT (OUTPATIENT)
Dept: ONCOLOGY | Facility: CLINIC | Age: 43
End: 2018-10-23

## 2018-10-23 ENCOUNTER — LAB (OUTPATIENT)
Dept: LAB | Facility: HOSPITAL | Age: 43
End: 2018-10-23

## 2018-10-23 VITALS
TEMPERATURE: 98.5 F | SYSTOLIC BLOOD PRESSURE: 122 MMHG | OXYGEN SATURATION: 100 % | BODY MASS INDEX: 24.86 KG/M2 | WEIGHT: 149.2 LBS | HEIGHT: 65 IN | HEART RATE: 110 BPM | DIASTOLIC BLOOD PRESSURE: 86 MMHG | RESPIRATION RATE: 12 BRPM

## 2018-10-23 DIAGNOSIS — G25.81 RESTLESS LEG SYNDROME: ICD-10-CM

## 2018-10-23 DIAGNOSIS — T45.1X5A NEUROPATHY DUE TO CHEMOTHERAPEUTIC DRUG (HCC): ICD-10-CM

## 2018-10-23 DIAGNOSIS — D50.0 IRON DEFICIENCY ANEMIA DUE TO CHRONIC BLOOD LOSS: ICD-10-CM

## 2018-10-23 DIAGNOSIS — Z85.3 HISTORY OF RIGHT BREAST CANCER: ICD-10-CM

## 2018-10-23 DIAGNOSIS — Z85.3 HISTORY OF RIGHT BREAST CANCER: Primary | ICD-10-CM

## 2018-10-23 DIAGNOSIS — G62.0 NEUROPATHY DUE TO CHEMOTHERAPEUTIC DRUG (HCC): ICD-10-CM

## 2018-10-23 DIAGNOSIS — K76.0 FATTY LIVER: Primary | ICD-10-CM

## 2018-10-23 PROBLEM — K75.81 NASH (NONALCOHOLIC STEATOHEPATITIS): Status: ACTIVE | Noted: 2018-10-23

## 2018-10-23 PROBLEM — R00.0 TACHYCARDIA: Status: ACTIVE | Noted: 2018-10-23

## 2018-10-23 PROBLEM — R10.33 PERIUMBILICAL ABDOMINAL PAIN: Status: ACTIVE | Noted: 2018-10-23

## 2018-10-23 PROBLEM — R63.4 WEIGHT LOSS: Status: ACTIVE | Noted: 2018-10-23

## 2018-10-23 LAB
ALBUMIN SERPL-MCNC: 4.7 G/DL (ref 3.5–5.2)
ALBUMIN/GLOB SERPL: 1.6 G/DL (ref 1.1–2.4)
ALP SERPL-CCNC: 93 U/L (ref 38–116)
ALT SERPL W P-5'-P-CCNC: 22 U/L (ref 0–33)
ANION GAP SERPL CALCULATED.3IONS-SCNC: 10.8 MMOL/L
AST SERPL-CCNC: 22 U/L (ref 0–32)
BASOPHILS # BLD AUTO: 0.07 10*3/MM3 (ref 0–0.1)
BASOPHILS NFR BLD AUTO: 1.2 % (ref 0–1.1)
BILIRUB SERPL-MCNC: 0.3 MG/DL (ref 0.1–1.2)
BUN BLD-MCNC: 20 MG/DL (ref 6–20)
BUN/CREAT SERPL: 22.5 (ref 7.3–30)
CALCIUM SPEC-SCNC: 10.1 MG/DL (ref 8.5–10.2)
CANCER AG15-3 SERPL-ACNC: 19 U/ML
CHLORIDE SERPL-SCNC: 104 MMOL/L (ref 98–107)
CO2 SERPL-SCNC: 27.2 MMOL/L (ref 22–29)
CREAT BLD-MCNC: 0.89 MG/DL (ref 0.6–1.1)
DEPRECATED RDW RBC AUTO: 39.2 FL (ref 37–49)
EOSINOPHIL # BLD AUTO: 0.06 10*3/MM3 (ref 0–0.36)
EOSINOPHIL NFR BLD AUTO: 1 % (ref 1–5)
ERYTHROCYTE [DISTWIDTH] IN BLOOD BY AUTOMATED COUNT: 12.2 % (ref 11.7–14.5)
GFR SERPL CREATININE-BSD FRML MDRD: 69 ML/MIN/1.73
GLOBULIN UR ELPH-MCNC: 3 GM/DL (ref 1.8–3.5)
GLUCOSE BLD-MCNC: 101 MG/DL (ref 74–124)
HCT VFR BLD AUTO: 39.3 % (ref 34–45)
HGB BLD-MCNC: 12.8 G/DL (ref 11.5–14.9)
IMM GRANULOCYTES # BLD: 0.03 10*3/MM3 (ref 0–0.03)
IMM GRANULOCYTES NFR BLD: 0.5 % (ref 0–0.5)
LYMPHOCYTES # BLD AUTO: 2.16 10*3/MM3 (ref 1–3.5)
LYMPHOCYTES NFR BLD AUTO: 36.2 % (ref 20–49)
MCH RBC QN AUTO: 28.3 PG (ref 27–33)
MCHC RBC AUTO-ENTMCNC: 32.6 G/DL (ref 32–35)
MCV RBC AUTO: 86.9 FL (ref 83–97)
MONOCYTES # BLD AUTO: 0.35 10*3/MM3 (ref 0.25–0.8)
MONOCYTES NFR BLD AUTO: 5.9 % (ref 4–12)
NEUTROPHILS # BLD AUTO: 3.3 10*3/MM3 (ref 1.5–7)
NEUTROPHILS NFR BLD AUTO: 55.2 % (ref 39–75)
NRBC BLD MANUAL-RTO: 0 /100 WBC (ref 0–0)
PLATELET # BLD AUTO: 285 10*3/MM3 (ref 150–375)
PMV BLD AUTO: 9.6 FL (ref 8.9–12.1)
POTASSIUM BLD-SCNC: 4.6 MMOL/L (ref 3.5–4.7)
PROT SERPL-MCNC: 7.7 G/DL (ref 6.3–8)
RBC # BLD AUTO: 4.52 10*6/MM3 (ref 3.9–5)
SODIUM BLD-SCNC: 142 MMOL/L (ref 134–145)
T4 FREE SERPL-MCNC: 1.22 NG/DL (ref 0.93–1.7)
TSH SERPL DL<=0.05 MIU/L-ACNC: 2.54 MIU/ML (ref 0.27–4.2)
WBC NRBC COR # BLD: 5.97 10*3/MM3 (ref 4–10)

## 2018-10-23 PROCEDURE — 80053 COMPREHEN METABOLIC PANEL: CPT | Performed by: INTERNAL MEDICINE

## 2018-10-23 PROCEDURE — 84439 ASSAY OF FREE THYROXINE: CPT | Performed by: INTERNAL MEDICINE

## 2018-10-23 PROCEDURE — 86300 IMMUNOASSAY TUMOR CA 15-3: CPT | Performed by: INTERNAL MEDICINE

## 2018-10-23 PROCEDURE — 85025 COMPLETE CBC W/AUTO DIFF WBC: CPT | Performed by: INTERNAL MEDICINE

## 2018-10-23 PROCEDURE — 99215 OFFICE O/P EST HI 40 MIN: CPT | Performed by: INTERNAL MEDICINE

## 2018-10-23 PROCEDURE — 84443 ASSAY THYROID STIM HORMONE: CPT | Performed by: INTERNAL MEDICINE

## 2018-10-23 PROCEDURE — 36415 COLL VENOUS BLD VENIPUNCTURE: CPT | Performed by: INTERNAL MEDICINE

## 2018-10-23 RX ORDER — ALPRAZOLAM 0.5 MG/1
TABLET ORAL
COMMUNITY
End: 2019-02-14

## 2018-10-23 RX ORDER — GABAPENTIN 300 MG/1
CAPSULE ORAL
COMMUNITY
End: 2019-02-14

## 2018-10-23 RX ORDER — ATENOLOL 25 MG/1
TABLET ORAL
COMMUNITY
End: 2019-02-14

## 2018-10-23 NOTE — PROGRESS NOTES
REASONS FOR FOLLOWUP:      1.    History of stage IIB right breast cancer, T2 N1a M0, status post mastectomy and TRAM flap reconstruction. Left prophylactic mastectomy and implant. The patient completed dose-dense Adriamycin/Cytoxan x4, Taxol every 3 x4 in February 2012.  Tamoxifen was i  n  itiated in March 2012.COMPLETED 4/18    2. Peripheral neuropathy, sensory, non-motor associated with restless leg syndrom  e and triggered by Taxol treatment. The patient has failed multiple adjuvant therapies for this including Neurontin, Cymbalta and Lyrica. The patient has discontinued Lortab realizing that she is dependent on this medicine and has moved to tramadol 50 mg   twice a day. She has significant restless legs at night according to the patient’  s . Under the present circumstances we measured a B12, folic acid level and a ferritin level because both iron deficiency and B12 deficiency can trigger restless leg syn  drome. The patient will be informed about these reports once they become available. The patient is aware that she continue followup with us every 3 months and doing urine drug screen every 3 months to continue prescription of narcotics. Under no circumsta  nces Lortab will be given to her.     3.   Anemia. The patient has iron deficiency anemia that has been corrected with IV Venofer with 3 Hemoccult's that were negative. The patient’s hemoglobin as per 11/30/2015 has finally improved. Her ferritin level and iron lev  el have normalized. These have no impact whatsoever in her restless leg syndrome, meaning that this condition per se is genetic and not related to iron deficiency. We will continue monitoring her ferritin and iron profile in 2 months and 6 months.     4. Chronic   narcotic use to control restless legs and neuropathic pain.          HISTORY OF PRESENT ILLNESS:    This patient returns today to the office for followup complaining that during the last 6 weeks she has been  having periumbilical crampy pain in the abdomen on a daily basis that is bothering her day-by-day more and more.  This pain is triggered by food, is not relieved by anything that she does and is not associated with urination or defecation.  She has had occasional nausea. She has vomited on a couple of occasions with no hematemesis.  She has not developed any jaundice.  Her bowel activity has remained normal in frequency with no passage of blood in the stools.  No abdominal distention. No heartburn or indigestion. She has early satiety.  She has lost 6-pounds of weight during this period of time. She has not had any fevers or chills.  Urination is ongoing with no difficulties.  She has not had any cough, sputum production or shortness of breath.  She has not had any skeletal pain. She has not had any rashes in the skin, bone or joint pain, no neurological symptomatology.  Given the persistency of symptomatology she has had an ultrasound of her abdomen that documented fatty liver, maybe a lesion in the liver but Dr. Domingo, Radiologist, was not 100% sure and this raised the question given her previous history of breast cancer the reason she is having the abdominal symptomatology.                  PAST MEDICAL HISTORY:  Rather unremarkable.  She did have a history of right ovarian abscess in 2003 for which she underwent laparoscopic right oophorectomy and salpingectomy.          Otherwise past surgeries include double mastectomy, 07/27/2011, with reconstruction. Status post C-sections x2; one in 1992 and one in 1998.         The patient underwent robotic surgery by Dr. Jermaine Su in June 2013 given the development of abdominal and pelvic pain.  She had a hemorrhagic cyst in   the left ovary that was removed.  She also had bilateral oophorectomy and hysterectomy, not finding any malignancy in the surgical specimens of the uterus, cervix, fallopian tubes, or ovaries.  Given these facts and given the fact as well that  the patien  t   has recovered performance status, on 09/20/2013 we advised her to proceed with therapy with Femara at the dose of 2.5 mg a day instead of Tamoxifen.  The patient was advised to initiate a program of walking exercise to minimize bone loss and advised her   to initiate calcium and vitamin D supplementation.  We requested a DEXA scan as well.          OB/GYN HISTORY:  Menarche age 11.  LMP 08/01/11.  She has 7 days of flow which is normal.  She took BCPs briefly several years ago but none recently.  P3, G3.          ONCOLOGIC-HEMATOLOGIC HISTORY: History from previous dates can be found in a separate document.         After being intolerant to oral iron preparation and having a low iron and low ferritin level the patient received Venofer with improvement in the hemoglobin, iron a  nd TIBC. The patient had normal B12 and normal folic acid levels. Two Hemoccult's were negative for blood.         On 11/30/2015 she was advised to remain no her tamoxifen. She was advised to remain on her narcotic medication at the same dosing to control her neuropathic pain in her feet as a consequence of the previous chemotherapy.         MEDICATIONS: The current medication list was reviewed with the patient and updated in the EMR this date per the Medical Assistant.  Medication dosages and frequencies were confirmed to be accurate.         ALLERGIES:  No known allergies.         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  Denies tobacco or alcohol.  Denies illicit drug use.          FAMILY HISTORY:  Negative for breast or ovarian cancers, negative for other malignancies.  Mother has hypertension.  As of 09/18/2015 the patient’s son, who is a very avid athlete,   16-years-old, has developed atrial fibrillation and has required ablation by the group of pediatric cardiologists at the [facility].         REVIEW OF SYSTEMS:        General: no fever, no chills, STATED fatigue,NEGATIVE  6 LB weight changes, no lack of appetite.  Eyes: no epiphora, xerophthalmia,conjunctivitis, pain, glaucoma, blurred vision, blindness, secretion, photophobia, proptosis, diplopia.  Ears: no otorrhea, tinnitus, otorrhagia, deafness, pain, vertigo.  Nose: no rhinorrhea, no epistaxis, no alteration in perception of odors, no sinuses pressure.  Mouth: no alteration in gums or teeth,  No ulcers, no difficulty with mastication or deglut ion, no odynophagia.  Neck: no masses or pain, no thyroid alterations, no pain in muscles or arteries, no carotid odynia, no crepitation.  Respiratory: no cough, no sputum production,no dyspnea,no trepopnea, no pleuritic pain,no hemoptysis.  Heart: no syncope, no irregularity, no palpitations, no angina,no orthopnea,no paroxysmal nocturnal dyspnea.  Vascular Venous: no tenderness,no edema,no palpable cords,no postphlebitic syndrome, no skin changes no ulcerations.  Vascular Arterial: no distal ischemia, noclaudication, no gangrene, no neuropathic ischemic pain, no skin ulcers, no paleness no cyanosis.  GI: no dysphagia, no odynophagia, no regurgitation, no heartburn,no indigestion,STATED nausea,AND vomiting,no hematemesis ,no melena,no jaundice,no distention, no obstipation,no enterorrhagia,no proctalgia,no anal  lesions, no changes in bowel habits.STATED PAIN  : no frequency, no hesitancy, no hematuria, no discharge,no  pain.  Musculoskeletal: no muscle or tendon pain or inflammation,no  joint pain, no edema, no functional limitation,no fasciculations, no mass.  Neurologic: no headache, no seizures, noalterations on Craneal nerves, no motor deficit, UNCHANGED sensory deficit, normal coordination, no alteration in memory,normal orientation, calculation,normal writting, verbal and written language.  Skin: no rashes,no pruritus no localized lesions.  Psychiatric: no anxiety, no depression,no agitation, no delusions, proper insight.     VITAL SIGNS:    Vitals:    10/23/18 0916   BP: 122/86  "  Pulse: 110   Resp: 12   Temp: 98.5 °F (36.9 °C)   TempSrc: Oral   SpO2: 100%   Weight: 67.7 kg (149 lb 3.2 oz)   Height: 165 cm (64.96\")       PAIN:  3 out of 10      ECO        PHYSICAL EXAMINATION:    GENERAL:  Well-developed, well-nourished  Patient  in no acute distress.   SKIN:  Warm, dry ,NO rashes,NO purpura ,NO petechiae.  HEENT:  Pupils were equal and reactive to light and accomodation, conjunctivas non injected, no pterigion, normal extraocular movements, normal visual acuity.   Mouth mucosa was moist, no exudates in oropharynx, normal gum line, normal roof of the mouth and pillars, normal papillations of the tongue.  NECK:  Supple with good range of motion; no thyromegaly or masses, no JVD or bruits, no cervical adenopathies.No carotid arteries pain, no carotid abnormal pulsation , NO arterial dance.  LYMPHATICS:  No cervical, NO supraclavicular, NO axillary,NO epitrochlear , NO inguinal adenopathy.  CHEST:  Normal excursion of both joanna thoraces, normal voice fremitus, no subcutaneous emphysema, normal axillas, no rashes or acanthosis nigricans. Lungs clear to percussion and auscultation, normal breath sounds bilaterally, no wheezing,NO crackles NO ronchi, NO stridor, NO rubs.  CARDIAC AND VASCULAR:  normal rate and regular rhythm, without murmurs,NO rubs NO S3 NO S4 right or left . Normal femoral, popliteal, pedis, brachial and carotid pulses.  INSPECTION of  breast documented symmetry of the tissue per se and location and size of the nipple,no retractions or inversion of the nipple, normal skin without lesions, no erythema or nodules,no paud'orange, no prominence of superficial veins or chest wall collateral circulation.PALPATION of the breast documented normal skin turgor, no induration, alteration in local temperature, or pain, no palpable masses or nodules, normal mobility of the tissues,no fixation of the tissue or parenchyma to the chest wall, no alteration at the tail of the breasts or " axillas, no adenopathies. BILATERAL BREAST RECONSTRUCTIONS Surgical site WERE well healed.No lymphedema in either extremity.  ABDOMEN:  Soft,  PERIUMBILICAL tender with no organomegaly or masses, no ascites, no collateral circulation,no distention,no [place] sign, , no inguinal hernias,no umbilical hernia, no abdominal bruits. Normal bowel sounds.  GENITAL: Not  Performed.  EXTREMITIES  AND SPINE:  No clubbing, cyanosis or edema, no deformities or pain .No kyphosis, scoliosis, deformities or pain in spine, ribs or pelvic bone.  NEUROLOGICAL:  Patient was awake, alert, oriented to time, person and place.        LABORATORY DATA: COMPLETE ABDOMINAL SONOGRAM     HISTORY: 43-year-old female with a history of abdominal pain.     FINDINGS: Sonographic evaluation of the abdomen was performed. There is  a normal sonographic appearance of both kidneys. There is mild increased  echogenicity seen throughout the majority of the liver with relative  sparing in the region adjacent to the gallbladder over a focal region  measuring 1.2 x 1.7 x 1.1 cm. The liver measures on the order of 14.5 cm  in anterior posterior dimension. The gallbladder has a normal  appearance. The common bile duct measures 0.5 cm in diameter. Per the  sonographer, the patient was nontender in the right upper quadrant  during the examination. The visualized portion of the pancreas appears  normal. The spleen has a normal appearance measuring on the order of  11.4 cm in greatest dimension. The visualized portion of the abdominal  aorta and IVC appear normal.     IMPRESSION:  1. Findings consistent with mild hepatic steatosis with suspected mild  focal fatty sparing adjacent to the gallbladder fossa. I suspect that  the area that is focally hypoechoic within the liver is focal fatty  sparing, given the patient's history of a prior breast malignancy, a  follow-up CT of the abdomen without and with contrast in 3 months is  recommended for better characterization  of the area of imaging interest  in the liver.  2. Otherwise normal sonogram of the abdomen.     This report was finalized on 8/15/2018 5:29 PM by Dr. Orlando Domingo M.D.  Component      Latest Ref Rng & Units 6/28/2017 4/16/2018 10/23/2018           4:13 PM 10:08 AM  8:57 AM   WBC      4.00 - 10.00 10*3/mm3 6.90 5.31 5.97   RBC      3.90 - 5.00 10*6/mm3 4.19 4.15 4.52   Hemoglobin      11.5 - 14.9 g/dL 12.3 12.4 12.8   Hematocrit      34.0 - 45.0 % 37.1 37.1 39.3   MCV      83.0 - 97.0 fL 88.5 89.4 86.9   MCH      27.0 - 33.0 pg 29.4 29.9 28.3   MCHC      32.0 - 35.0 g/dL 33.2 33.4 32.6   RDW      11.7 - 14.5 % 12.3 12.2 12.2   RDW-SD      37.0 - 49.0 fl 39.8 39.7 39.2   MPV      8.9 - 12.1 fL 9.3 9.8 9.6   Platelets      150 - 375 10*3/mm3 234 205 285   Neutrophil %      39.0 - 75.0 % 58.7 50.3 55.2   Lymphocyte %      20.0 - 49.0 % 34.6 39.5 36.2   Monocyte %      4.0 - 12.0 % 4.2 5.8 5.9                           ASSESSMENT: 1. History of stage IIB right breast cancer, T2 N1a M0, status post mastectomy and TRAM flap reconstruction. Left prophylactic mastectomy and implant. The patient completed dose-dense Adriamycin/Cytoxan x4, Taxol every 3 x4 in February 2012.  Tamoxifen was i  n  itiated in March 2012 AND COMPLETED 4/18. I find no need for mammograms. The patient will return for this in 6 months.   2.Peripheral neuropathy, sensory, non-motor associated with restless leg syndrom  e and triggered by Taxol treatment. The patient has failed multiple adjuvant therapies for this including Neurontin, Cymbalta and Lyrica. The patient has discontinued Lortab realizing that she is dependent on this medicine and has moved to tramadol 50 mg   twice a day. She has significant restless legs at night according to the patient’  s . Under the present circumstances we measured a B12, folic acid level and a ferritin level because both iron deficiency and B12 deficiency can trigger restless leg syn  drome.   3.  Anemia.  The patient has iron deficiency anemia that has been corrected with IV Venofer with 3 Hemoccult's that were negative. The patient’s hemoglobin as per today has finally improved. These have no impact whatsoever in her restless leg syndrome, meaning that this condition per se is genetic and not related to iron deficiency.     4.Since the previous visit and for the last 6 weeks the patient has been having lack of appetite, weight loss, fatigue, abdominal pain in the periumbilical area, nausea and vomiting on a couple of occasions.  The nature of this is not clear to me, but raises the problem if she has small intestinal disease or a colon problem.  She is defecating normally.  We remind ourselves that she was anemic a year ago, Hemoccult's were tested being negative and the patient received iron therapy with resolution.      Given the ultrasound report as stated above, I would like for this patient to proceed as follows:  1.  I have not changed any of her medicines.   2.  I have advised her that she has tachycardia and I have asked her if she is taking thyroid medication.  According to her she is taking her thyroid on a regular basis, she is not dehydrated, she is having no diarrhea, she has no issues in regard to her general health to proceed with a tachycardia like it is. I think this needs to be watched and reviewed again in a week.   3.  I advised her to proceed with a CT scan of the abdomen and pelvis in hours.  4.  I made her a referral to be seen by Gastroenterology, Hua Perez MD.  I think eventually she is going to need to have at least a colonoscopy.  5.  Patient went back to the lab and we took a chemistry profile to review her TSH, her tumor marker CA 15-3, T3, T4 and a chemistry profile.   6.  I will review her back in a week and try to put it altogether.     I did not change any medicines for her but advised her to talk to Dr. Baez to get rid of the Tylenol component of her pain medication that she is  taking on a chronic basis for neuropathy.

## 2018-10-24 ENCOUNTER — HOSPITAL ENCOUNTER (OUTPATIENT)
Dept: CT IMAGING | Facility: HOSPITAL | Age: 43
Discharge: HOME | End: 2018-10-24
Attending: INTERNAL MEDICINE | Admitting: INTERNAL MEDICINE

## 2018-10-24 DIAGNOSIS — T45.1X5A NEUROPATHY DUE TO CHEMOTHERAPEUTIC DRUG (HCC): ICD-10-CM

## 2018-10-24 DIAGNOSIS — G25.81 RESTLESS LEG SYNDROME: ICD-10-CM

## 2018-10-24 DIAGNOSIS — D50.0 IRON DEFICIENCY ANEMIA DUE TO CHRONIC BLOOD LOSS: ICD-10-CM

## 2018-10-24 DIAGNOSIS — G62.0 NEUROPATHY DUE TO CHEMOTHERAPEUTIC DRUG (HCC): ICD-10-CM

## 2018-10-24 DIAGNOSIS — Z85.3 HISTORY OF RIGHT BREAST CANCER: ICD-10-CM

## 2018-10-24 PROCEDURE — 74177 CT ABD & PELVIS W/CONTRAST: CPT

## 2018-10-24 PROCEDURE — 25010000002 IOPAMIDOL 61 % SOLUTION: Performed by: INTERNAL MEDICINE

## 2018-10-24 RX ADMIN — IOPAMIDOL 85 ML: 612 INJECTION, SOLUTION INTRAVENOUS at 14:59

## 2018-10-31 ENCOUNTER — APPOINTMENT (OUTPATIENT)
Dept: ONCOLOGY | Facility: CLINIC | Age: 43
End: 2018-10-31

## 2018-10-31 ENCOUNTER — APPOINTMENT (OUTPATIENT)
Dept: LAB | Facility: HOSPITAL | Age: 43
End: 2018-10-31

## 2018-11-06 ENCOUNTER — OFFICE VISIT (OUTPATIENT)
Dept: GASTROENTEROLOGY | Facility: CLINIC | Age: 43
End: 2018-11-06

## 2018-11-06 VITALS
BODY MASS INDEX: 25.78 KG/M2 | DIASTOLIC BLOOD PRESSURE: 76 MMHG | HEIGHT: 64 IN | SYSTOLIC BLOOD PRESSURE: 124 MMHG | TEMPERATURE: 98.2 F | WEIGHT: 151 LBS

## 2018-11-06 DIAGNOSIS — R10.13 DYSPEPSIA: ICD-10-CM

## 2018-11-06 DIAGNOSIS — Z12.11 ENCOUNTER FOR SCREENING FOR MALIGNANT NEOPLASM OF COLON: Primary | ICD-10-CM

## 2018-11-06 PROCEDURE — 99204 OFFICE O/P NEW MOD 45 MIN: CPT | Performed by: INTERNAL MEDICINE

## 2018-11-06 RX ORDER — PANTOPRAZOLE SODIUM 40 MG/1
40 TABLET, DELAYED RELEASE ORAL DAILY
Qty: 90 TABLET | Refills: 3 | Status: SHIPPED | OUTPATIENT
Start: 2018-11-06 | End: 2020-11-12

## 2018-11-06 NOTE — PROGRESS NOTES
Answers for HPI/ROS submitted by the patient on 11/5/2018   Abdominal pain  Chronicity: new  Onset: more than 1 month ago  Onset quality: gradual  Frequency: 2 to 4 times per day  Episode duration: 1 hours  Progression since onset: waxing and waning  Pain location: RLQ, suprapubic region  Pain - numeric: 2/10  Pain quality: dull  Radiates to: RLQ  anorexia: Yes  arthralgias: No  belching: Yes  constipation: Yes  diarrhea: No  dysuria: No  fever: No  flatus: Yes  frequency: No  headaches: No  hematochezia: No  hematuria: No  melena: No  myalgias: No  nausea: Yes  weight loss: Yes  vomiting: Yes  Aggravated by: deep breathing, eating  Relieved by: bowel movements, certain positions, movement, palpation, passing flatus, recumbency  Diagnostic workup: CT scan, ultrasound  Chief Complaint   Patient presents with   • Abdominal Pain   • Anemia     Rosana Griffin is a 43 y.o. female who presents with a history of abdominal pain and dyspepsia   HPI     Patient 43-year-old female with history of breast cancer status post iron deficiency in the past resolved with an iron replacement.  Patient was found negative blood in the stool at that time and currently iron and blood tests are normal.  Patient has noted 6 weeks of increasing periumbilical discomfort with meals.  Patient denies significant weight loss no fever or chills but evaluation was recommended.  Patient underwent right upper quadrant ultrasound which revealed fatty liver with possible sparing though they could not rule out liver lesions.  Patient underwent CT with contrast which showed no liver lesions and fatty liver with sparing.  Patient with ongoing symptoms referred for further recommendations.  Patient with no fever or chills no jaundice or abnormal liver enzymes.    Past Medical History:   Diagnosis Date   • Anemia     Iron deficiency   • Anxiety    • Cancer (CMS/HCC)     Stage IIB right breast, [id number]   • Chronic narcotic use     To control restless legs and  neuropathic pain.    • Depression    • Hypertension    • Hypothyroid    • Lung nodule    • Ovarian abscess 2003    Right   • Peripheral neuropathy    • RLS (restless legs syndrome)    • Splenic artery aneurysm (CMS/HCC)     1 CM AS OF 5/2016       Current Outpatient Prescriptions:   •  atenolol (TENORMIN) 25 MG tablet, atenolol 25 mg tablet  1 po qd, Disp: , Rfl:   •  fentaNYL (DURAGESIC) 50 MCG/HR patch, Place 1 patch on the skin as directed by provider Every 72 (Seventy-Two) Hours., Disp: 10 patch, Rfl: 0  •  gabapentin (NEURONTIN) 300 MG capsule, gabapentin 300 mg capsule, Disp: , Rfl:   •  oxyCODONE (ROXICODONE) 10 MG tablet, Take 1 tablet by mouth Every 4 (Four) Hours., Disp: 84 tablet, Rfl: 0  •  ALPRAZolam (XANAX) 0.5 MG tablet, Take 0.5 mg by mouth at night as needed for anxiety., Disp: , Rfl:   •  ALPRAZolam (XANAX) 0.5 MG tablet, alprazolam 0.5 mg tablet  TAKE 1 TABLET BY MOUTH TWICE DAILY, Disp: , Rfl:   •  hepatitis A (HAVRIX) 1440 EL U/ML vaccine, Inject  into the shoulder, thigh, or buttocks., Disp: 1 mL, Rfl: 0  •  hepatitis A (HAVRIX) 1440 EL U/ML vaccine, Havrix (PF) 1,440 CESIA unit/mL intramuscular syringe, Disp: , Rfl:   •  levothyroxine (SYNTHROID, LEVOTHROID) 25 MCG tablet, Take 25 mcg by mouth daily., Disp: , Rfl:   •  metroNIDAZOLE (FLAGYL) 500 MG tablet, Take 1 tablet by mouth 4 (Four) Times a Day., Disp: 40 tablet, Rfl: 0  •  oxyCODONE (ROXICODONE) 15 MG immediate release tablet, Take 1 tablet by mouth Every 4 (Four) Hours., Disp: 18 tablet, Rfl: 0  •  oxyCODONE-acetaminophen (PERCOCET)  MG per tablet, TK 1 T PO  QID FOR PAIN, Disp: , Rfl: 0  •  oxyCODONE-acetaminophen (PERCOCET)  MG per tablet, Take 1 tablet by mouth Every 4 (Four) Hours., Disp: 180 tablet, Rfl: 0  •  pantoprazole (PROTONIX) 40 MG EC tablet, Take 1 tablet by mouth Daily., Disp: 90 tablet, Rfl: 3  Allergies   Allergen Reactions   • Morphine Unknown (See Comments)     unknown     Social History     Social History    • Marital status:      Spouse name: Ko   • Number of children: N/A   • Years of education: N/A     Occupational History   • Radiology clerk [employer]     Social History Main Topics   • Smoking status: Never Smoker   • Smokeless tobacco: Never Used   • Alcohol use No   • Drug use: No   • Sexual activity: Not on file     Other Topics Concern   • Not on file     Social History Narrative   • No narrative on file     Family History   Problem Relation Age of Onset   • Hypertension Mother    • Atrial fibrillation Son         Required ablatopm   • Breast cancer Neg Hx    • Ovarian cancer Neg Hx    • Cancer Neg Hx      Review of Systems   Constitutional: Negative.  Negative for fever.   HENT: Negative.    Eyes: Negative.    Respiratory: Negative.    Cardiovascular: Negative.    Gastrointestinal: Positive for abdominal pain, constipation, nausea and vomiting. Negative for abdominal distention, anal bleeding, blood in stool, diarrhea and rectal pain.   Endocrine: Negative.    Genitourinary: Negative for dysuria, frequency and hematuria.   Musculoskeletal: Negative.  Negative for arthralgias and myalgias.   Skin: Negative.    Allergic/Immunologic: Negative.    Neurological: Negative for headaches.   Hematological: Negative.      Vitals:    11/06/18 0929   BP: 124/76   Temp: 98.2 °F (36.8 °C)     Physical Exam   Constitutional: She is oriented to person, place, and time. She appears well-developed and well-nourished.   HENT:   Head: Normocephalic and atraumatic.   Eyes: Pupils are equal, round, and reactive to light. No scleral icterus.   Cardiovascular: Normal rate, regular rhythm and normal heart sounds.  Exam reveals no gallop and no friction rub.    No murmur heard.  Pulmonary/Chest: Effort normal and breath sounds normal. She has no wheezes. She has no rales.   Abdominal: Soft. Bowel sounds are normal. She exhibits no shifting dullness, no distension, no pulsatile liver, no fluid wave, no  abdominal bruit, no ascites, no pulsatile midline mass and no mass. There is no hepatosplenomegaly. There is no tenderness. There is no rigidity and no guarding. No hernia.   Musculoskeletal: Normal range of motion. She exhibits no edema.   Neurological: She is alert and oriented to person, place, and time. No cranial nerve deficit.   Skin: Skin is warm and dry. No rash noted.   Psychiatric: She has a normal mood and affect. Her behavior is normal. Thought content normal.   Nursing note and vitals reviewed.    Diagnoses and all orders for this visit:    Encounter for screening for malignant neoplasm of colon  -     Case Request; Standing  -     Case Request    Dyspepsia  -     Case Request; Standing  -     Case Request    Other orders  -     Follow Anesthesia Guidelines / Standing Orders; Future  -     Implement Anesthesia orders day of procedure.; Standing  -     Obtain informed consent; Standing  -     pantoprazole (PROTONIX) 40 MG EC tablet; Take 1 tablet by mouth Daily.    Patient 43-year-old female with history of breast cancer presenting with 6 weeks of recurrent umbilical discomfort with dyspepsia increased with eating.  Patient reports no change in discomfort with bowels or urination.  No significant change with position or activity.  Patient underwent ultrasound revealing what appeared to be fatty liver with some sparing though could not rule out neoplasm.  Patient then went to CAT scan which revealed again fatty liver with no evidence of liver mass.  Patient was noted with normal appearing colon with paucity of stool but no inflammation.  On my review however CT revealed moderate amount of retained food to patient reports was actually fasting for that procedure.  Antrum was also noted to be relatively thickened despite the rest of the stomach being thin walled.  At this point agree with proceeding with EGD to evaluate changes noted in the upper GI tract.  Patient at 43 with a history of breast cancer would  certainly proceed with recommended screening at 43.  We'll follow-up clinically based on results and begin proton pump inhibitor due to the antral changes.

## 2018-11-06 NOTE — H&P (VIEW-ONLY)
Answers for HPI/ROS submitted by the patient on 11/5/2018   Abdominal pain  Chronicity: new  Onset: more than 1 month ago  Onset quality: gradual  Frequency: 2 to 4 times per day  Episode duration: 1 hours  Progression since onset: waxing and waning  Pain location: RLQ, suprapubic region  Pain - numeric: 2/10  Pain quality: dull  Radiates to: RLQ  anorexia: Yes  arthralgias: No  belching: Yes  constipation: Yes  diarrhea: No  dysuria: No  fever: No  flatus: Yes  frequency: No  headaches: No  hematochezia: No  hematuria: No  melena: No  myalgias: No  nausea: Yes  weight loss: Yes  vomiting: Yes  Aggravated by: deep breathing, eating  Relieved by: bowel movements, certain positions, movement, palpation, passing flatus, recumbency  Diagnostic workup: CT scan, ultrasound  Chief Complaint   Patient presents with   • Abdominal Pain   • Anemia     Rosana Griffin is a 43 y.o. female who presents with a history of abdominal pain and dyspepsia   HPI     Patient 43-year-old female with history of breast cancer status post iron deficiency in the past resolved with an iron replacement.  Patient was found negative blood in the stool at that time and currently iron and blood tests are normal.  Patient has noted 6 weeks of increasing periumbilical discomfort with meals.  Patient denies significant weight loss no fever or chills but evaluation was recommended.  Patient underwent right upper quadrant ultrasound which revealed fatty liver with possible sparing though they could not rule out liver lesions.  Patient underwent CT with contrast which showed no liver lesions and fatty liver with sparing.  Patient with ongoing symptoms referred for further recommendations.  Patient with no fever or chills no jaundice or abnormal liver enzymes.    Past Medical History:   Diagnosis Date   • Anemia     Iron deficiency   • Anxiety    • Cancer (CMS/HCC)     Stage IIB right breast, [id number]   • Chronic narcotic use     To control restless legs and  neuropathic pain.    • Depression    • Hypertension    • Hypothyroid    • Lung nodule    • Ovarian abscess 2003    Right   • Peripheral neuropathy    • RLS (restless legs syndrome)    • Splenic artery aneurysm (CMS/HCC)     1 CM AS OF 5/2016       Current Outpatient Prescriptions:   •  atenolol (TENORMIN) 25 MG tablet, atenolol 25 mg tablet  1 po qd, Disp: , Rfl:   •  fentaNYL (DURAGESIC) 50 MCG/HR patch, Place 1 patch on the skin as directed by provider Every 72 (Seventy-Two) Hours., Disp: 10 patch, Rfl: 0  •  gabapentin (NEURONTIN) 300 MG capsule, gabapentin 300 mg capsule, Disp: , Rfl:   •  oxyCODONE (ROXICODONE) 10 MG tablet, Take 1 tablet by mouth Every 4 (Four) Hours., Disp: 84 tablet, Rfl: 0  •  ALPRAZolam (XANAX) 0.5 MG tablet, Take 0.5 mg by mouth at night as needed for anxiety., Disp: , Rfl:   •  ALPRAZolam (XANAX) 0.5 MG tablet, alprazolam 0.5 mg tablet  TAKE 1 TABLET BY MOUTH TWICE DAILY, Disp: , Rfl:   •  hepatitis A (HAVRIX) 1440 EL U/ML vaccine, Inject  into the shoulder, thigh, or buttocks., Disp: 1 mL, Rfl: 0  •  hepatitis A (HAVRIX) 1440 EL U/ML vaccine, Havrix (PF) 1,440 CESIA unit/mL intramuscular syringe, Disp: , Rfl:   •  levothyroxine (SYNTHROID, LEVOTHROID) 25 MCG tablet, Take 25 mcg by mouth daily., Disp: , Rfl:   •  metroNIDAZOLE (FLAGYL) 500 MG tablet, Take 1 tablet by mouth 4 (Four) Times a Day., Disp: 40 tablet, Rfl: 0  •  oxyCODONE (ROXICODONE) 15 MG immediate release tablet, Take 1 tablet by mouth Every 4 (Four) Hours., Disp: 18 tablet, Rfl: 0  •  oxyCODONE-acetaminophen (PERCOCET)  MG per tablet, TK 1 T PO  QID FOR PAIN, Disp: , Rfl: 0  •  oxyCODONE-acetaminophen (PERCOCET)  MG per tablet, Take 1 tablet by mouth Every 4 (Four) Hours., Disp: 180 tablet, Rfl: 0  •  pantoprazole (PROTONIX) 40 MG EC tablet, Take 1 tablet by mouth Daily., Disp: 90 tablet, Rfl: 3  Allergies   Allergen Reactions   • Morphine Unknown (See Comments)     unknown     Social History     Social History    • Marital status:      Spouse name: Ko   • Number of children: N/A   • Years of education: N/A     Occupational History   • Radiology clerk [employer]     Social History Main Topics   • Smoking status: Never Smoker   • Smokeless tobacco: Never Used   • Alcohol use No   • Drug use: No   • Sexual activity: Not on file     Other Topics Concern   • Not on file     Social History Narrative   • No narrative on file     Family History   Problem Relation Age of Onset   • Hypertension Mother    • Atrial fibrillation Son         Required ablatopm   • Breast cancer Neg Hx    • Ovarian cancer Neg Hx    • Cancer Neg Hx      Review of Systems   Constitutional: Negative.  Negative for fever.   HENT: Negative.    Eyes: Negative.    Respiratory: Negative.    Cardiovascular: Negative.    Gastrointestinal: Positive for abdominal pain, constipation, nausea and vomiting. Negative for abdominal distention, anal bleeding, blood in stool, diarrhea and rectal pain.   Endocrine: Negative.    Genitourinary: Negative for dysuria, frequency and hematuria.   Musculoskeletal: Negative.  Negative for arthralgias and myalgias.   Skin: Negative.    Allergic/Immunologic: Negative.    Neurological: Negative for headaches.   Hematological: Negative.      Vitals:    11/06/18 0929   BP: 124/76   Temp: 98.2 °F (36.8 °C)     Physical Exam   Constitutional: She is oriented to person, place, and time. She appears well-developed and well-nourished.   HENT:   Head: Normocephalic and atraumatic.   Eyes: Pupils are equal, round, and reactive to light. No scleral icterus.   Cardiovascular: Normal rate, regular rhythm and normal heart sounds.  Exam reveals no gallop and no friction rub.    No murmur heard.  Pulmonary/Chest: Effort normal and breath sounds normal. She has no wheezes. She has no rales.   Abdominal: Soft. Bowel sounds are normal. She exhibits no shifting dullness, no distension, no pulsatile liver, no fluid wave, no  abdominal bruit, no ascites, no pulsatile midline mass and no mass. There is no hepatosplenomegaly. There is no tenderness. There is no rigidity and no guarding. No hernia.   Musculoskeletal: Normal range of motion. She exhibits no edema.   Neurological: She is alert and oriented to person, place, and time. No cranial nerve deficit.   Skin: Skin is warm and dry. No rash noted.   Psychiatric: She has a normal mood and affect. Her behavior is normal. Thought content normal.   Nursing note and vitals reviewed.    Diagnoses and all orders for this visit:    Encounter for screening for malignant neoplasm of colon  -     Case Request; Standing  -     Case Request    Dyspepsia  -     Case Request; Standing  -     Case Request    Other orders  -     Follow Anesthesia Guidelines / Standing Orders; Future  -     Implement Anesthesia orders day of procedure.; Standing  -     Obtain informed consent; Standing  -     pantoprazole (PROTONIX) 40 MG EC tablet; Take 1 tablet by mouth Daily.    Patient 43-year-old female with history of breast cancer presenting with 6 weeks of recurrent umbilical discomfort with dyspepsia increased with eating.  Patient reports no change in discomfort with bowels or urination.  No significant change with position or activity.  Patient underwent ultrasound revealing what appeared to be fatty liver with some sparing though could not rule out neoplasm.  Patient then went to CAT scan which revealed again fatty liver with no evidence of liver mass.  Patient was noted with normal appearing colon with paucity of stool but no inflammation.  On my review however CT revealed moderate amount of retained food to patient reports was actually fasting for that procedure.  Antrum was also noted to be relatively thickened despite the rest of the stomach being thin walled.  At this point agree with proceeding with EGD to evaluate changes noted in the upper GI tract.  Patient at 43 with a history of breast cancer would  certainly proceed with recommended screening at 43.  We'll follow-up clinically based on results and begin proton pump inhibitor due to the antral changes.

## 2018-11-12 ENCOUNTER — ANESTHESIA EVENT (OUTPATIENT)
Dept: GASTROENTEROLOGY | Facility: HOSPITAL | Age: 43
End: 2018-11-12

## 2018-11-12 ENCOUNTER — ANESTHESIA (OUTPATIENT)
Dept: GASTROENTEROLOGY | Facility: HOSPITAL | Age: 43
End: 2018-11-12

## 2018-11-12 ENCOUNTER — HOSPITAL ENCOUNTER (OUTPATIENT)
Facility: HOSPITAL | Age: 43
Setting detail: HOSPITAL OUTPATIENT SURGERY
Discharge: HOME | End: 2018-11-12
Attending: INTERNAL MEDICINE | Admitting: INTERNAL MEDICINE

## 2018-11-12 VITALS
SYSTOLIC BLOOD PRESSURE: 137 MMHG | WEIGHT: 149 LBS | DIASTOLIC BLOOD PRESSURE: 95 MMHG | HEART RATE: 91 BPM | OXYGEN SATURATION: 100 % | HEIGHT: 64 IN | BODY MASS INDEX: 25.44 KG/M2 | RESPIRATION RATE: 14 BRPM | TEMPERATURE: 98.4 F

## 2018-11-12 DIAGNOSIS — Z12.11 ENCOUNTER FOR SCREENING FOR MALIGNANT NEOPLASM OF COLON: ICD-10-CM

## 2018-11-12 DIAGNOSIS — R10.13 DYSPEPSIA: ICD-10-CM

## 2018-11-12 PROCEDURE — 45378 DIAGNOSTIC COLONOSCOPY: CPT | Performed by: INTERNAL MEDICINE

## 2018-11-12 PROCEDURE — 88305 TISSUE EXAM BY PATHOLOGIST: CPT | Performed by: INTERNAL MEDICINE

## 2018-11-12 PROCEDURE — 25010000002 PROPOFOL 10 MG/ML EMULSION: Performed by: ANESTHESIOLOGY

## 2018-11-12 PROCEDURE — 43239 EGD BIOPSY SINGLE/MULTIPLE: CPT | Performed by: INTERNAL MEDICINE

## 2018-11-12 RX ORDER — PROPOFOL 10 MG/ML
VIAL (ML) INTRAVENOUS AS NEEDED
Status: DISCONTINUED | OUTPATIENT
Start: 2018-11-12 | End: 2018-11-12 | Stop reason: SURG

## 2018-11-12 RX ORDER — SODIUM CHLORIDE, SODIUM LACTATE, POTASSIUM CHLORIDE, CALCIUM CHLORIDE 600; 310; 30; 20 MG/100ML; MG/100ML; MG/100ML; MG/100ML
30 INJECTION, SOLUTION INTRAVENOUS CONTINUOUS PRN
Status: DISCONTINUED | OUTPATIENT
Start: 2018-11-12 | End: 2018-11-12 | Stop reason: HOSPADM

## 2018-11-12 RX ORDER — LIDOCAINE HYDROCHLORIDE 20 MG/ML
INJECTION, SOLUTION INFILTRATION; PERINEURAL AS NEEDED
Status: DISCONTINUED | OUTPATIENT
Start: 2018-11-12 | End: 2018-11-12 | Stop reason: SURG

## 2018-11-12 RX ORDER — PROPOFOL 10 MG/ML
VIAL (ML) INTRAVENOUS CONTINUOUS PRN
Status: DISCONTINUED | OUTPATIENT
Start: 2018-11-12 | End: 2018-11-12 | Stop reason: SURG

## 2018-11-12 RX ADMIN — PROPOFOL 120 MG: 10 INJECTION, EMULSION INTRAVENOUS at 13:08

## 2018-11-12 RX ADMIN — SODIUM CHLORIDE, POTASSIUM CHLORIDE, SODIUM LACTATE AND CALCIUM CHLORIDE 30 ML/HR: 600; 310; 30; 20 INJECTION, SOLUTION INTRAVENOUS at 12:30

## 2018-11-12 RX ADMIN — LIDOCAINE HYDROCHLORIDE 70 MG: 20 INJECTION, SOLUTION INFILTRATION; PERINEURAL at 13:08

## 2018-11-12 RX ADMIN — PROPOFOL 160 MCG/KG/MIN: 10 INJECTION, EMULSION INTRAVENOUS at 13:08

## 2018-11-12 RX ADMIN — PROPOFOL 40 MG: 10 INJECTION, EMULSION INTRAVENOUS at 13:17

## 2018-11-12 NOTE — DISCHARGE INSTRUCTIONS
For the next 24 hours patient needs to be with a responsible adult.    For 24 hours DO NOT drive, operate machinery, appliances, drink alcohol, make important decisions or sign legal documents.    Start with a light or bland diet and advance to regular diet as tolerated.    Follow recommendations on procedure report provided by your doctor.    Call Dr Quinones for problems [phone] and for biopsy results in 7-10 days    Problems may include but not limited to: large amounts of bleeding, trouble breathing, repeated vomiting, severe unrelieved pain, fever or chills.

## 2018-11-12 NOTE — ANESTHESIA POSTPROCEDURE EVALUATION
"Patient: Rosana Griffin    Procedure Summary     Date:  11/12/18 Room / Location:  [facility] ENDOSCOPY 6 / [facility] ENDOSCOPY    Anesthesia Start:  1304 Anesthesia Stop:  1344    Procedures:       COLONOSCOPY TO CECUM & T.I. (N/A )      ESOPHAGOGASTRODUODENOSCOPY WITH COLD BIOPSIES (N/A Esophagus) Diagnosis:       Dyspepsia      Encounter for screening for malignant neoplasm of colon      Diverticulosis      Internal hemorrhoids without complication      Gastritis      Duodenitis      (Dyspepsia [R10.13])      (Encounter for screening for malignant neoplasm of colon [Z12.11])    Surgeon:  Dong Quinones MD Provider:  Matt Tong MD    Anesthesia Type:  MAC ASA Status:  3          Anesthesia Type: MAC  Last vitals  BP   137/95 (11/12/18 1401)   Temp   36.9 °C (98.4 °F) (11/12/18 1218)   Pulse   91 (11/12/18 1401)   Resp   14 (11/12/18 1401)     SpO2   100 % (11/12/18 1401)     Post Anesthesia Care and Evaluation    Patient location during evaluation: bedside  Patient participation: complete - patient participated  Level of consciousness: awake and alert  Pain management: adequate  Airway patency: patent  Anesthetic complications: No anesthetic complications    Cardiovascular status: acceptable  Respiratory status: acceptable  Hydration status: acceptable    Comments: /95 (BP Location: Left arm, Patient Position: Lying)   Pulse 91   Temp 36.9 °C (98.4 °F) (Oral)   Resp 14   Ht 162.6 cm (64.02\")   Wt 67.6 kg (149 lb)   SpO2 100%   BMI 25.56 kg/m²       "

## 2018-11-12 NOTE — ANESTHESIA PREPROCEDURE EVALUATION
Anesthesia Evaluation     Patient summary reviewed   no history of anesthetic complications:  NPO Solid Status: > 8 hours  NPO Liquid Status: > 2 hours           Airway   Mallampati: III  TM distance: >3 FB  Neck ROM: full  Dental      Pulmonary     breath sounds clear to auscultation  (-) shortness of breath, not a smoker  Cardiovascular   Exercise tolerance: good (4-7 METS)    Rhythm: regular  Rate: normal    (+) hypertension, PVD,   (-) angina, GUNTER      Neuro/Psych  (+) numbness, psychiatric history Anxiety and Depression,     GI/Hepatic/Renal/Endo    (+)   hepatitis, liver disease (MERINO), hypothyroidism,     ROS Comment: Splenic artery aneurism (1cm at last check)    Musculoskeletal     Abdominal    Substance History   (+) drug use (Chronic opioid use for restless leg syndrome )     OB/GYN          Other      history of cancer (Breast)                  Anesthesia Plan    ASA 3     MAC     intravenous induction   Anesthetic plan, all risks, benefits, and alternatives have been provided, discussed and informed consent has been obtained with: patient.

## 2018-11-12 NOTE — BRIEF OP NOTE
COLONOSCOPY, ESOPHAGOGASTRODUODENOSCOPY  Progress Note    Rosana Griffin  11/12/2018    Pre-op Diagnosis:   Dyspepsia [R10.13]  Encounter for screening for malignant neoplasm of colon [Z12.11]       Post-Op Diagnosis Codes:     * Dyspepsia [R10.13]     * Encounter for screening for malignant neoplasm of colon [Z12.11]     * Diverticulosis [K57.90]     * Internal hemorrhoids without complication [K64.8]     * Gastritis [K29.70]     * Duodenitis [K29.8]    Procedure/CPT® Codes:      Procedure(s):  COLONOSCOPY TO CECUM & T.I.  ESOPHAGOGASTRODUODENOSCOPY WITH COLD BIOPSIES    Surgeon(s):  Dong Quinones MD    Anesthesia: Monitor Anesthesia Care    Staff:   Endo Technician: Arminda King  Endo Nurse: Lisseth Nicholas RN    Estimated Blood Loss: minimal    Urine Voided: * No values recorded between 11/12/2018  1:02 PM and 11/12/2018  1:39 PM *    Specimens:                ID Type Source Tests Collected by Time   A : GASTRIC ANTRUM BIOPSIES Tissue Stomach TISSUE PATHOLOGY EXAM Dong Quinones MD 11/12/2018 1336         Drains:      Findings: Colonoscopy to terminal ileum with diverticulosis and internal hemorrhoids.  EGD with gastritis and duodenitis antral biopsies obtained rule out H. pylori.    Complications: None      Dong Quinones MD     Date: 11/12/2018  Time: 1:41 PM

## 2018-11-13 LAB
CYTO UR: NORMAL
LAB AP CASE REPORT: NORMAL
PATH REPORT.FINAL DX SPEC: NORMAL
PATH REPORT.GROSS SPEC: NORMAL

## 2018-11-16 ENCOUNTER — TELEPHONE (OUTPATIENT)
Dept: GASTROENTEROLOGY | Facility: CLINIC | Age: 43
End: 2018-11-16

## 2018-11-16 NOTE — TELEPHONE ENCOUNTER
Recommend given her hx and symptoms that she go to ER for evaluation. Call us Monday with update. Thanks.

## 2018-11-16 NOTE — TELEPHONE ENCOUNTER
----- Message from Varsha Kumar sent at 11/16/2018  9:32 AM EST -----  Regarding: NOT FEELING WELL   Contact: [phone]  COMPLAIN OF N/V AND NOT ABLE TO EAT SINCE SCOPE ON Monday..

## 2018-11-16 NOTE — TELEPHONE ENCOUNTER
Called pt... No answer after multiple rings; left a message that given her history and her current symptoms, ETHEL Ramírez recommends to go to the ER for an evaluation. Advised she also recommends to call us back Monday with an update.

## 2018-11-16 NOTE — TELEPHONE ENCOUNTER
Called pt back. Pt states she has been having issues ever since her scopes were done on Monday this week. She had been having issues with getting full too quickly prior to her scopes (and that is still occurring) but these symptoms are new. She states she has been dealing with nausea, vomiting, abdominal cramping and diarrhea for the last few days. She has already had 3 loose/watery stools this AM; she has had 8-9 BMs per day since scope. Nausea is really bad and she has vomited a total of 3-4 times since her scope. Pt has had chills, but also very cold-natured person, she does not feel like she has had a fever. She denies seeing any blood in her stool. She has seen mucous in the stool and has noted a foul odor. Abdominal cramping relieved by BM. 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 She is asking if phenergan could be called in; she has used Zofran in the past but it caused migraines. She has not tried anything to help with diarrhea yet; she wanted to call first. Advised will update Joni and Jackie and call back with recs. Pt verb understanding.

## 2018-12-13 RX ORDER — FENTANYL 50 UG/H
1 PATCH TRANSDERMAL
Qty: 10 PATCH | Refills: 0 | Status: CANCELLED | OUTPATIENT
Start: 2018-12-13

## 2018-12-19 ENCOUNTER — TELEPHONE (OUTPATIENT)
Dept: GASTROENTEROLOGY | Facility: CLINIC | Age: 43
End: 2018-12-19

## 2018-12-19 NOTE — TELEPHONE ENCOUNTER
----- Message from Dong Quinones MD sent at 11/25/2018 10:25 AM EST -----  Pt h pylori negative, continue PPI, if no improvement change PPI

## 2018-12-19 NOTE — TELEPHONE ENCOUNTER
Patient called, advised as per Dr. Quinones's note. She states the Pantoprazole is working and she no longer has any symptoms.

## 2019-02-14 ENCOUNTER — APPOINTMENT (OUTPATIENT)
Dept: PREADMISSION TESTING | Facility: HOSPITAL | Age: 44
End: 2019-02-14

## 2019-02-14 VITALS
TEMPERATURE: 98 F | WEIGHT: 156 LBS | SYSTOLIC BLOOD PRESSURE: 143 MMHG | BODY MASS INDEX: 26.63 KG/M2 | HEART RATE: 96 BPM | HEIGHT: 64 IN | OXYGEN SATURATION: 100 % | RESPIRATION RATE: 16 BRPM | DIASTOLIC BLOOD PRESSURE: 89 MMHG

## 2019-02-14 LAB
ANION GAP SERPL CALCULATED.3IONS-SCNC: 11.5 MMOL/L
BUN BLD-MCNC: 21 MG/DL (ref 6–20)
BUN/CREAT SERPL: 23.6 (ref 7–25)
CALCIUM SPEC-SCNC: 9.7 MG/DL (ref 8.6–10.5)
CHLORIDE SERPL-SCNC: 97 MMOL/L (ref 98–107)
CO2 SERPL-SCNC: 29.5 MMOL/L (ref 22–29)
CREAT BLD-MCNC: 0.89 MG/DL (ref 0.57–1)
DEPRECATED RDW RBC AUTO: 41.4 FL (ref 37–54)
ERYTHROCYTE [DISTWIDTH] IN BLOOD BY AUTOMATED COUNT: 12.8 % (ref 12.3–15.4)
GFR SERPL CREATININE-BSD FRML MDRD: 68 ML/MIN/1.73
GLUCOSE BLD-MCNC: 87 MG/DL (ref 65–99)
HCT VFR BLD AUTO: 34.7 % (ref 34–46.6)
HGB BLD-MCNC: 11 G/DL (ref 12–15.9)
MCH RBC QN AUTO: 28.1 PG (ref 26.6–33)
MCHC RBC AUTO-ENTMCNC: 31.7 G/DL (ref 31.5–35.7)
MCV RBC AUTO: 88.5 FL (ref 79–97)
PLATELET # BLD AUTO: 254 10*3/MM3 (ref 140–450)
PMV BLD AUTO: 10 FL (ref 6–12)
POTASSIUM BLD-SCNC: 4.2 MMOL/L (ref 3.5–5.2)
RBC # BLD AUTO: 3.92 10*6/MM3 (ref 3.77–5.28)
SODIUM BLD-SCNC: 138 MMOL/L (ref 136–145)
WBC NRBC COR # BLD: 9.99 10*3/MM3 (ref 3.4–10.8)

## 2019-02-14 PROCEDURE — 93010 ELECTROCARDIOGRAM REPORT: CPT | Performed by: INTERNAL MEDICINE

## 2019-02-14 PROCEDURE — 85027 COMPLETE CBC AUTOMATED: CPT | Performed by: PLASTIC SURGERY

## 2019-02-14 PROCEDURE — 93005 ELECTROCARDIOGRAM TRACING: CPT

## 2019-02-14 PROCEDURE — 36415 COLL VENOUS BLD VENIPUNCTURE: CPT

## 2019-02-14 PROCEDURE — 80048 BASIC METABOLIC PNL TOTAL CA: CPT | Performed by: PLASTIC SURGERY

## 2019-02-14 RX ORDER — ATENOLOL 25 MG/1
25 TABLET ORAL EVERY MORNING
COMMUNITY
End: 2020-11-12

## 2019-02-14 RX ORDER — GABAPENTIN 300 MG/1
300 CAPSULE ORAL 3 TIMES DAILY
COMMUNITY
End: 2020-11-12

## 2019-02-14 RX ORDER — FENTANYL 50 UG/H
1 PATCH TRANSDERMAL
COMMUNITY

## 2019-02-14 NOTE — DISCHARGE INSTRUCTIONS
PLEASE ARRIVE AT 12PM ON 3/12/2019      Take the following medications the morning of surgery with a small sip of water:  GABAPENTIN, PROTONIX AND ATENOLOL        General Instructions:  • Do not eat solid food after midnight the night before surgery.  • You may drink clear liquids day of surgery but must stop at least one hour before your hospital arrival time. **STOP FLUIDS AT 11AM DAY OF SURGERY**  • It is beneficial for you to have a clear drink that contains carbohydrates the day of surgery.  We suggest a 12 to 20 ounce bottle of Gatorade or Powerade for non-diabetic patients or a 12 to 20 ounce bottle of G2 or Powerade Zero for diabetic patients. (Pediatric patients, are not advised to drink a 12 to 20 ounce carbohydrate drink)    Clear liquids are liquids you can see through.  Nothing red in color.     Plain water                               Sports drinks  Sodas                                   Gelatin (Jell-O)  Fruit juices without pulp such as white grape juice and apple juice  Popsicles that contain no fruit or yogurt  Tea or coffee (no cream or milk added)  Gatorade / Powerade  G2 / Powerade Zero    • Infants may have breast milk up to four hours before surgery.  • Infants drinking formula may drink formula up to six hours before surgery.   • Patients who avoid smoking, chewing tobacco and alcohol for 4 weeks prior to surgery have a reduced risk of post-operative complications.  Quit smoking as many days before surgery as you can.  • Do not smoke, use chewing tobacco or drink alcohol the day of surgery.   • If applicable bring your C-PAP/ BI-PAP machine.  • Bring any papers given to you in the doctor’s office.  • Wear clean comfortable clothes and socks.  • Do not wear contact lenses or make-up.  Bring a case for your glasses.   • Bring crutches or walker if applicable.  • Remove all piercings.  Leave jewelry and any other valuables at home.  • Hair extensions with metal clips must be removed prior to  surgery.  • The Pre-Admission Testing nurse will instruct you to bring medications if unable to obtain an accurate list in Pre-Admission Testing.            Preventing a Surgical Site Infection:  • For 2 to 3 days before surgery, avoid shaving with a razor because the razor can irritate skin and make it easier to develop an infection.    • Any areas of open skin can increase the risk of a post-operative wound infection by allowing bacteria to enter and travel throughout the body.  Notify your surgeon if you have any skin wounds / rashes even if it is not near the expected surgical site.  The area will need assessed to determine if surgery should be delayed until it is healed.  • The night prior to surgery sleep in a clean bed with clean clothing.  Do not allow pets to sleep with you.  • Shower on the morning of surgery using a fresh bar of anti-bacterial soap (such as Dial) and clean washcloth.  Dry with a clean towel and dress in clean clothing.  • Ask your surgeon if you will be receiving antibiotics prior to surgery.  • Make sure you, your family, and all healthcare providers clean their hands with soap and water or an alcohol based hand  before caring for you or your wound.    Day of surgery:  Upon arrival, a Pre-op nurse and Anesthesiologist will review your health history, obtain vital signs, and answer questions you may have.  The only belongings needed at this time will be your home medications and if applicable your C-PAP/BI-PAP machine.  If you are staying overnight your family can leave the rest of your belongings in the car and bring them to your room later.  A Pre-op nurse will start an IV and you may receive medication in preparation for surgery, including something to help you relax.  Your family will be able to see you in the Pre-op area.  While you are in surgery your family should notify the waiting room  if they leave the waiting room area and provide a contact phone  number.    Please be aware that surgery does come with discomfort.  We want to make every effort to control your discomfort so please discuss any uncontrolled symptoms with your nurse.   Your doctor will most likely have prescribed pain medications.      If you are going home after surgery you will receive individualized written care instructions before being discharged.  A responsible adult must drive you to and from the hospital on the day of your surgery and stay with you for 24 hours.    If you are staying overnight following surgery, you will be transported to your hospital room following the recovery period.  [facility] has all private rooms.    You have received a list of surgical assistants for your reference.  If you have any questions please call Pre-Admission Testing at [phone].  Deductibles and co-payments are collected on the day of service. Please be prepared to pay the required co-pay, deductible or deposit on the day of service as defined by your plan.

## 2019-03-12 ENCOUNTER — ANESTHESIA (OUTPATIENT)
Dept: PERIOP | Facility: HOSPITAL | Age: 44
End: 2019-03-12

## 2019-03-12 ENCOUNTER — ANESTHESIA EVENT (OUTPATIENT)
Dept: PERIOP | Facility: HOSPITAL | Age: 44
End: 2019-03-12

## 2019-03-12 ENCOUNTER — HOSPITAL ENCOUNTER (OUTPATIENT)
Facility: HOSPITAL | Age: 44
Setting detail: HOSPITAL OUTPATIENT SURGERY
Discharge: HOME | End: 2019-03-12
Attending: PLASTIC SURGERY | Admitting: PLASTIC SURGERY

## 2019-03-12 VITALS
DIASTOLIC BLOOD PRESSURE: 88 MMHG | WEIGHT: 148.5 LBS | OXYGEN SATURATION: 99 % | HEIGHT: 64 IN | TEMPERATURE: 99.4 F | HEART RATE: 82 BPM | BODY MASS INDEX: 25.35 KG/M2 | SYSTOLIC BLOOD PRESSURE: 130 MMHG | RESPIRATION RATE: 16 BRPM

## 2019-03-12 PROCEDURE — 25010000002 KETOROLAC TROMETHAMINE PER 15 MG: Performed by: ANESTHESIOLOGY

## 2019-03-12 PROCEDURE — C1789 PROSTHESIS, BREAST, IMP: HCPCS | Performed by: PLASTIC SURGERY

## 2019-03-12 PROCEDURE — 25010000002 ONDANSETRON PER 1 MG: Performed by: ANESTHESIOLOGY

## 2019-03-12 PROCEDURE — 25010000002 FENTANYL CITRATE (PF) 100 MCG/2ML SOLUTION

## 2019-03-12 PROCEDURE — 25010000003 CEFAZOLIN IN DEXTROSE 2-4 GM/100ML-% SOLUTION: Performed by: PLASTIC SURGERY

## 2019-03-12 PROCEDURE — 25010000002 FENTANYL CITRATE (PF) 100 MCG/2ML SOLUTION: Performed by: NURSE ANESTHETIST, CERTIFIED REGISTERED

## 2019-03-12 PROCEDURE — 25010000002 PROPOFOL 10 MG/ML EMULSION: Performed by: NURSE ANESTHETIST, CERTIFIED REGISTERED

## 2019-03-12 PROCEDURE — 25010000002 HYDROMORPHONE PER 4 MG: Performed by: NURSE ANESTHETIST, CERTIFIED REGISTERED

## 2019-03-12 PROCEDURE — 25010000002 MIDAZOLAM PER 1 MG: Performed by: ANESTHESIOLOGY

## 2019-03-12 PROCEDURE — 25010000002 DEXAMETHASONE PER 1 MG: Performed by: NURSE ANESTHETIST, CERTIFIED REGISTERED

## 2019-03-12 DEVICE — BRST SIL NATRELLE INSPIRA SMOTH XF/P 700CC: Type: IMPLANTABLE DEVICE | Site: BREAST | Status: FUNCTIONAL

## 2019-03-12 DEVICE — BRST SIL NATRELLE INSPIRA SMOTH XF/P 650CC: Type: IMPLANTABLE DEVICE | Site: BREAST | Status: FUNCTIONAL

## 2019-03-12 RX ORDER — ACETAMINOPHEN 325 MG/1
650 TABLET ORAL ONCE AS NEEDED
Status: DISCONTINUED | OUTPATIENT
Start: 2019-03-12 | End: 2019-03-12 | Stop reason: HOSPADM

## 2019-03-12 RX ORDER — DIPHENHYDRAMINE HYDROCHLORIDE 50 MG/ML
12.5 INJECTION INTRAMUSCULAR; INTRAVENOUS
Status: DISCONTINUED | OUTPATIENT
Start: 2019-03-12 | End: 2019-03-12 | Stop reason: HOSPADM

## 2019-03-12 RX ORDER — DEXAMETHASONE SODIUM PHOSPHATE 10 MG/ML
INJECTION INTRAMUSCULAR; INTRAVENOUS AS NEEDED
Status: DISCONTINUED | OUTPATIENT
Start: 2019-03-12 | End: 2019-03-12 | Stop reason: SURG

## 2019-03-12 RX ORDER — LABETALOL HYDROCHLORIDE 5 MG/ML
5 INJECTION, SOLUTION INTRAVENOUS
Status: DISCONTINUED | OUTPATIENT
Start: 2019-03-12 | End: 2019-03-12 | Stop reason: HOSPADM

## 2019-03-12 RX ORDER — FENTANYL CITRATE 50 UG/ML
INJECTION, SOLUTION INTRAMUSCULAR; INTRAVENOUS
Status: COMPLETED
Start: 2019-03-12 | End: 2019-03-12

## 2019-03-12 RX ORDER — MIDAZOLAM HYDROCHLORIDE 1 MG/ML
1 INJECTION INTRAMUSCULAR; INTRAVENOUS
Status: DISCONTINUED | OUTPATIENT
Start: 2019-03-12 | End: 2019-03-12 | Stop reason: HOSPADM

## 2019-03-12 RX ORDER — HYDROCODONE BITARTRATE AND ACETAMINOPHEN 7.5; 325 MG/1; MG/1
1 TABLET ORAL ONCE AS NEEDED
Status: DISCONTINUED | OUTPATIENT
Start: 2019-03-12 | End: 2019-03-12 | Stop reason: HOSPADM

## 2019-03-12 RX ORDER — EPHEDRINE SULFATE 50 MG/ML
5 INJECTION, SOLUTION INTRAVENOUS ONCE AS NEEDED
Status: DISCONTINUED | OUTPATIENT
Start: 2019-03-12 | End: 2019-03-12 | Stop reason: HOSPADM

## 2019-03-12 RX ORDER — ONDANSETRON 2 MG/ML
4 INJECTION INTRAMUSCULAR; INTRAVENOUS ONCE AS NEEDED
Status: DISCONTINUED | OUTPATIENT
Start: 2019-03-12 | End: 2019-03-12 | Stop reason: HOSPADM

## 2019-03-12 RX ORDER — FLUMAZENIL 0.1 MG/ML
0.2 INJECTION INTRAVENOUS AS NEEDED
Status: DISCONTINUED | OUTPATIENT
Start: 2019-03-12 | End: 2019-03-12 | Stop reason: HOSPADM

## 2019-03-12 RX ORDER — PROPOFOL 10 MG/ML
VIAL (ML) INTRAVENOUS AS NEEDED
Status: DISCONTINUED | OUTPATIENT
Start: 2019-03-12 | End: 2019-03-12 | Stop reason: SURG

## 2019-03-12 RX ORDER — PROMETHAZINE HYDROCHLORIDE 25 MG/1
25 TABLET ORAL ONCE AS NEEDED
Status: DISCONTINUED | OUTPATIENT
Start: 2019-03-12 | End: 2019-03-12 | Stop reason: HOSPADM

## 2019-03-12 RX ORDER — KETOROLAC TROMETHAMINE 30 MG/ML
INJECTION, SOLUTION INTRAMUSCULAR; INTRAVENOUS AS NEEDED
Status: DISCONTINUED | OUTPATIENT
Start: 2019-03-12 | End: 2019-03-12 | Stop reason: SURG

## 2019-03-12 RX ORDER — SCOLOPAMINE TRANSDERMAL SYSTEM 1 MG/1
1 PATCH, EXTENDED RELEASE TRANSDERMAL ONCE
Status: DISCONTINUED | OUTPATIENT
Start: 2019-03-12 | End: 2019-03-12 | Stop reason: HOSPADM

## 2019-03-12 RX ORDER — FENTANYL CITRATE 50 UG/ML
50 INJECTION, SOLUTION INTRAMUSCULAR; INTRAVENOUS
Status: DISCONTINUED | OUTPATIENT
Start: 2019-03-12 | End: 2019-03-12 | Stop reason: HOSPADM

## 2019-03-12 RX ORDER — FENTANYL CITRATE 50 UG/ML
INJECTION, SOLUTION INTRAMUSCULAR; INTRAVENOUS AS NEEDED
Status: DISCONTINUED | OUTPATIENT
Start: 2019-03-12 | End: 2019-03-12 | Stop reason: SURG

## 2019-03-12 RX ORDER — SODIUM CHLORIDE, SODIUM LACTATE, POTASSIUM CHLORIDE, CALCIUM CHLORIDE 600; 310; 30; 20 MG/100ML; MG/100ML; MG/100ML; MG/100ML
9 INJECTION, SOLUTION INTRAVENOUS CONTINUOUS
Status: DISCONTINUED | OUTPATIENT
Start: 2019-03-12 | End: 2019-03-12 | Stop reason: HOSPADM

## 2019-03-12 RX ORDER — OXYCODONE AND ACETAMINOPHEN 7.5; 325 MG/1; MG/1
1 TABLET ORAL ONCE AS NEEDED
Status: DISCONTINUED | OUTPATIENT
Start: 2019-03-12 | End: 2019-03-12 | Stop reason: HOSPADM

## 2019-03-12 RX ORDER — BUPIVACAINE HYDROCHLORIDE AND EPINEPHRINE 2.5; 5 MG/ML; UG/ML
INJECTION, SOLUTION INFILTRATION; PERINEURAL AS NEEDED
Status: DISCONTINUED | OUTPATIENT
Start: 2019-03-12 | End: 2019-03-12 | Stop reason: HOSPADM

## 2019-03-12 RX ORDER — PROMETHAZINE HYDROCHLORIDE 25 MG/ML
12.5 INJECTION, SOLUTION INTRAMUSCULAR; INTRAVENOUS ONCE AS NEEDED
Status: DISCONTINUED | OUTPATIENT
Start: 2019-03-12 | End: 2019-03-12 | Stop reason: HOSPADM

## 2019-03-12 RX ORDER — MIDAZOLAM HYDROCHLORIDE 1 MG/ML
2 INJECTION INTRAMUSCULAR; INTRAVENOUS
Status: DISCONTINUED | OUTPATIENT
Start: 2019-03-12 | End: 2019-03-12 | Stop reason: HOSPADM

## 2019-03-12 RX ORDER — ONDANSETRON 2 MG/ML
INJECTION INTRAMUSCULAR; INTRAVENOUS AS NEEDED
Status: DISCONTINUED | OUTPATIENT
Start: 2019-03-12 | End: 2019-03-12 | Stop reason: SURG

## 2019-03-12 RX ORDER — HYDRALAZINE HYDROCHLORIDE 20 MG/ML
5 INJECTION INTRAMUSCULAR; INTRAVENOUS
Status: DISCONTINUED | OUTPATIENT
Start: 2019-03-12 | End: 2019-03-12 | Stop reason: HOSPADM

## 2019-03-12 RX ORDER — CEFAZOLIN SODIUM 2 G/100ML
2 INJECTION, SOLUTION INTRAVENOUS ONCE
Status: COMPLETED | OUTPATIENT
Start: 2019-03-12 | End: 2019-03-12

## 2019-03-12 RX ORDER — HYDROMORPHONE HYDROCHLORIDE 1 MG/ML
0.5 INJECTION, SOLUTION INTRAMUSCULAR; INTRAVENOUS; SUBCUTANEOUS
Status: DISCONTINUED | OUTPATIENT
Start: 2019-03-12 | End: 2019-03-12 | Stop reason: HOSPADM

## 2019-03-12 RX ORDER — SODIUM CHLORIDE 0.9 % (FLUSH) 0.9 %
3 SYRINGE (ML) INJECTION EVERY 12 HOURS SCHEDULED
Status: DISCONTINUED | OUTPATIENT
Start: 2019-03-12 | End: 2019-03-12 | Stop reason: HOSPADM

## 2019-03-12 RX ORDER — NALOXONE HCL 0.4 MG/ML
0.2 VIAL (ML) INJECTION AS NEEDED
Status: DISCONTINUED | OUTPATIENT
Start: 2019-03-12 | End: 2019-03-12 | Stop reason: HOSPADM

## 2019-03-12 RX ORDER — FAMOTIDINE 10 MG/ML
20 INJECTION, SOLUTION INTRAVENOUS ONCE
Status: COMPLETED | OUTPATIENT
Start: 2019-03-12 | End: 2019-03-12

## 2019-03-12 RX ORDER — SODIUM CHLORIDE 0.9 % (FLUSH) 0.9 %
3-10 SYRINGE (ML) INJECTION AS NEEDED
Status: DISCONTINUED | OUTPATIENT
Start: 2019-03-12 | End: 2019-03-12 | Stop reason: HOSPADM

## 2019-03-12 RX ORDER — LIDOCAINE HYDROCHLORIDE 20 MG/ML
INJECTION, SOLUTION INFILTRATION; PERINEURAL AS NEEDED
Status: DISCONTINUED | OUTPATIENT
Start: 2019-03-12 | End: 2019-03-12 | Stop reason: SURG

## 2019-03-12 RX ORDER — PROMETHAZINE HYDROCHLORIDE 25 MG/1
25 SUPPOSITORY RECTAL ONCE AS NEEDED
Status: DISCONTINUED | OUTPATIENT
Start: 2019-03-12 | End: 2019-03-12 | Stop reason: HOSPADM

## 2019-03-12 RX ORDER — DIPHENHYDRAMINE HCL 25 MG
25 CAPSULE ORAL
Status: DISCONTINUED | OUTPATIENT
Start: 2019-03-12 | End: 2019-03-12 | Stop reason: HOSPADM

## 2019-03-12 RX ADMIN — PROPOFOL 200 MG: 10 INJECTION, EMULSION INTRAVENOUS at 13:54

## 2019-03-12 RX ADMIN — ONDANSETRON 4 MG: 2 INJECTION INTRAMUSCULAR; INTRAVENOUS at 15:45

## 2019-03-12 RX ADMIN — FENTANYL CITRATE 50 MCG: 50 INJECTION, SOLUTION INTRAMUSCULAR; INTRAVENOUS at 17:06

## 2019-03-12 RX ADMIN — FENTANYL CITRATE 50 MCG: 50 INJECTION, SOLUTION INTRAMUSCULAR; INTRAVENOUS at 16:32

## 2019-03-12 RX ADMIN — FENTANYL CITRATE 25 MCG: 50 INJECTION, SOLUTION INTRAMUSCULAR; INTRAVENOUS at 15:31

## 2019-03-12 RX ADMIN — SODIUM CHLORIDE, POTASSIUM CHLORIDE, SODIUM LACTATE AND CALCIUM CHLORIDE: 600; 310; 30; 20 INJECTION, SOLUTION INTRAVENOUS at 15:16

## 2019-03-12 RX ADMIN — FENTANYL CITRATE 50 MCG: 50 INJECTION, SOLUTION INTRAMUSCULAR; INTRAVENOUS at 16:19

## 2019-03-12 RX ADMIN — DEXAMETHASONE SODIUM PHOSPHATE 8 MG: 10 INJECTION INTRAMUSCULAR; INTRAVENOUS at 14:00

## 2019-03-12 RX ADMIN — HYDROMORPHONE HYDROCHLORIDE 0.5 MG: 1 INJECTION, SOLUTION INTRAMUSCULAR; INTRAVENOUS; SUBCUTANEOUS at 16:45

## 2019-03-12 RX ADMIN — HYDROMORPHONE HYDROCHLORIDE 0.5 MG: 1 INJECTION, SOLUTION INTRAMUSCULAR; INTRAVENOUS; SUBCUTANEOUS at 17:00

## 2019-03-12 RX ADMIN — MIDAZOLAM 1 MG: 1 INJECTION INTRAMUSCULAR; INTRAVENOUS at 13:38

## 2019-03-12 RX ADMIN — FENTANYL CITRATE 25 MCG: 50 INJECTION, SOLUTION INTRAMUSCULAR; INTRAVENOUS at 15:18

## 2019-03-12 RX ADMIN — SCOPALAMINE 1 PATCH: 1 PATCH, EXTENDED RELEASE TRANSDERMAL at 12:25

## 2019-03-12 RX ADMIN — FENTANYL CITRATE 50 MCG: 50 INJECTION, SOLUTION INTRAMUSCULAR; INTRAVENOUS at 16:53

## 2019-03-12 RX ADMIN — SODIUM CHLORIDE, POTASSIUM CHLORIDE, SODIUM LACTATE AND CALCIUM CHLORIDE 9 ML/HR: 600; 310; 30; 20 INJECTION, SOLUTION INTRAVENOUS at 12:22

## 2019-03-12 RX ADMIN — FAMOTIDINE 20 MG: 10 INJECTION INTRAVENOUS at 13:20

## 2019-03-12 RX ADMIN — FENTANYL CITRATE 100 MCG: 50 INJECTION, SOLUTION INTRAMUSCULAR; INTRAVENOUS at 13:54

## 2019-03-12 RX ADMIN — MIDAZOLAM 1 MG: 1 INJECTION INTRAMUSCULAR; INTRAVENOUS at 13:20

## 2019-03-12 RX ADMIN — CEFAZOLIN SODIUM 2 G: 2 INJECTION, SOLUTION INTRAVENOUS at 13:57

## 2019-03-12 RX ADMIN — FENTANYL CITRATE 25 MCG: 50 INJECTION, SOLUTION INTRAMUSCULAR; INTRAVENOUS at 15:49

## 2019-03-12 RX ADMIN — FENTANYL CITRATE 50 MCG: 50 INJECTION, SOLUTION INTRAMUSCULAR; INTRAVENOUS at 14:49

## 2019-03-12 RX ADMIN — FENTANYL CITRATE 25 MCG: 50 INJECTION, SOLUTION INTRAMUSCULAR; INTRAVENOUS at 15:01

## 2019-03-12 RX ADMIN — LIDOCAINE HYDROCHLORIDE 60 MG: 20 INJECTION, SOLUTION INFILTRATION; PERINEURAL at 13:54

## 2019-03-12 RX ADMIN — HYDROMORPHONE HYDROCHLORIDE 0.5 MG: 1 INJECTION, SOLUTION INTRAMUSCULAR; INTRAVENOUS; SUBCUTANEOUS at 16:37

## 2019-03-12 RX ADMIN — HYDROCODONE BITARTRATE AND ACETAMINOPHEN 1 TABLET: 7.5; 325 TABLET ORAL at 16:35

## 2019-03-12 RX ADMIN — KETOROLAC TROMETHAMINE 30 MG: 30 INJECTION, SOLUTION INTRAMUSCULAR; INTRAVENOUS at 15:45

## 2019-03-12 NOTE — ANESTHESIA PREPROCEDURE EVALUATION
Anesthesia Evaluation     no history of anesthetic complications:               Airway   Mallampati: I  TM distance: >3 FB  Neck ROM: full  Dental - normal exam     Pulmonary    (-) not a smoker  Cardiovascular     (+) hypertension, PVD (splenic art aneurysm),       Neuro/Psych    ROS Comment: Neuropathy  GI/Hepatic/Renal/Endo    (-) GERD, hepatitis, liver disease, hypothyroidism    Musculoskeletal     Abdominal    Substance History      OB/GYN          Other      history of cancer (breast)                    Anesthesia Plan    ASA 2     general     intravenous induction   Anesthetic plan, all risks, benefits, and alternatives have been provided, discussed and informed consent has been obtained with: patient.

## 2019-03-12 NOTE — OP NOTE
PREOPERATIVE DIAGNOSES: Palpable fold, medial left breast implant status post bilateral reconstruction following mastectomy.     POSTOPERATIVE DIAGNOSES: Palpable fold, medial left breast implant status post bilateral reconstruction following mastectomy with mild breast asymmetry.     PROCEDURES PERFORMED:   1. Left breast implant exchange, removing a high profile 550 mL smooth intact Woodacre silicone gel implant and exchanging for a Natrelle style SCX, 700 mL smooth cohesive breast implant.  2. Removal right implant, Woodacre smooth high profile silicone gel size 550 mL and exchanging with a Natrelle style SCX, 650 mL smooth cohesive gel implant.     SURGEON: Maurine Waterhouse, MD    ANESTHESIA: General endotracheal anesthesia supplemented with local injection using 0.25% Marcaine with epinephrine.     INDICATIONS: The patient is a 44-year-old female who is approximately 8 years status post bilateral mastectomies with immediate reconstruction using expanders and then implants. She had focal radiation to the right upper outer breast and axilla, and was treated with chemotherapy postoperatively. She has done well; however, recently notes a fold and some discomfort in the left medial lower breast. The palpable fold in her implant is noted here where there is fairly thin coverage. The option for radiologic screening versus exchange to a slightly aden firmer implant was discussed. She would like to exchange her implant and correct a mild asymmetry with a slightly smaller left breast preoperatively. She presently has 550 mL high profile implants. These will be exchanged for a 650 mL implant on the right and a 700 mL implant on the left.     DESCRIPTION OF PROCEDURE: The patient was marked preoperatively in a standing position to kameron the previous lateral breast incisions and slight creases and indentions along the reconstructed breast on either side. Kefzol was administered preoperatively. The patient was then brought to  the operating room where she was placed under general endotracheal anesthesia. The chest was prepped and draped out. The right side was treated first excising a small ellipse of previous scar laterally. Beneath this the incision was divided with cautery, dividing through capsule and exposing an intact implant. This implant was removed. The pocket was then inspected and injected along the upper medial capsule in order to allow some release here. A slight crease in the upper medial breast was noted and this was released from internally, dividing through capsule where a scar band was creating this tightness. Bleeding points were cauterized. The defect was irrigated with Bacitracin irrigation. A Natrelle style SCX, ultra high profile smooth cohesive implant, size 650 mL, was placed into this pocket and the incision edges closed temporarily with external sutures.     A similar procedure was then done on the left side, removing an intact implant and examining the pocket. This pocket was released in a slightly greater fashion extending from approximately 8-o'clock around to 1-o'clock to release a slightly tighter capsule superiorly. Additional bands in the upper medial and lower medial breast were released. The pocket was inspected and irrigated with Bacitracin irrigation. Hemostasis was confirmed. A Natrelle SCX, size 700 mL, implant was placed into this pocket in order to correct slight asymmetry. The incision was closed temporarily with outside sutures and the patient was sat upright. Overall size and symmetry now appeared appropriate with the flap creases improved. She was returned to the supine position. The external sutures were removed and each incision then closed with layers of 2-0 Vicryl capsule suture followed by 3-0 Vicryl dermal stitches and 4-0 Vicryl subcuticular skin stitches. Benzoin and Steri-Strips were placed across the incisions followed by sterile wrap with Kerlix and Ace wrap.     There were no  specimens and no cultures taken. Blood loss was scant. The patient tolerated the procedure well and was discharged to the recovery room in stable condition.

## 2019-03-12 NOTE — ANESTHESIA PROCEDURE NOTES
Airway  Urgency: elective      General Information and Staff    Patient location during procedure: OR  Anesthesiologist: Jn Fitzpatrick MD  CRNA: Sophia Cruz CRNA    Indications and Patient Condition    Preoxygenated: yes  Mask difficulty assessment: 0 - not attempted    Final Airway Details  Final airway type: supraglottic airway      Successful airway: unique  Size 4    Number of attempts at approach: 1    Additional Comments  Atraumatic, adeq seal, secured, MV/AV until SV

## 2019-03-12 NOTE — DISCHARGE INSTRUCTIONS
Scopolamine Patch  This patch has been applied to the skin behind one of your ears.  It may stay in place up to 24 hours. You may remove it at any time after your surgery; however, it should be removed after you are up and walking around the next day.  This medicine reduces stomach upset. Side effects may include: dry mouth, dizziness, sleepiness, constipation, or upset stomach.  An allergy would show up as: a rash, itching, wheezing or shortness of breath.  Follow these instructions:  1. Do not drink alcohol, drive or operate machinery while taking this medicine.  2. Wear only 1 patch at a time. You can leave the patch on for up to 24 hours.  3. When you remove the patch, fold it in half with the sticky sides together and throw it away. Wash your hands and the area under the patch.  4. Do not touch your eye with your hand if it has touched the patch.  5. Wash your hands well before and after touching the patch.  6. Sit or stand slowly to avoid dizziness.  Call your doctor if you have:  1. Any sign of allergy  2. No relief  3. Trouble passing urine  4. Any new or severe symptoms      **Please remove the patch behind your ear tonight or tomorrow.**            Outpatient Surgery Guidelines, Adult  Outpatient procedures are those for which the person having the procedure is allowed to go home the same day as the procedure. Various procedures are done on an outpatient basis. You should follow some general guidelines if you will be having an outpatient procedure.  AFTER THE  PROCEDURE  After surgery, you will be taken to a recovery area, where your progress will be monitored. If there are no complications, you will be allowed to go home when you are awake, stable, and taking fluids well. You may have numbness around the surgical site. Healing will take some time. You will have tenderness at the surgical site and may have some swelling and bruising. You may also have some nausea.  HOME CARE INSTRUCTIONS  · Do not drive  for 24 hours, or as directed by your health care provider. Do not drive while taking prescription pain medicines.  · Do not drink alcohol for 24 hours.  · Do not make important decisions or sign legal documents for 24 hours.  · Plan on having a responsible adult stay with your for 24 hours following your procedure.  · You may resume a normal diet and activities as directed.  · Do not lift anything heavier than 10 pounds (4.5 kg) or play contact sports until your health care provider says it is okay.  · Only take over-the-counter or prescription medicines as directed by your health care provider.  · Follow up with your health care provider as directed.  SEEK MEDICAL CARE IF:  · You have increased bleeding (more than a small spot) from the surgical site.  · You have redness, swelling, or increasing pain in the wound.  · You see pus coming from the wound.  · You have a fever > 101.  · You notice a bad smell coming from the wound or dressing.  · You feel lightheaded or faint.  · You develop a rash.  · You have trouble breathing.  · You develop allergies.  MAKE SURE YOU:  · Understand these instructions.  · Will watch your condition.  · Will get help right away if you are not doing well or get worse.

## 2019-03-12 NOTE — H&P
Patient Care Team:  Max Wheat MD as PCP - General  Max Wheat MD as PCP - Family Medicine  Ayo Munoz MD as Consulting Physician (Hematology and Oncology)  Eliseo Lyons MD as Referring Physician (General Surgery)    Chief complaintpalpable fold in medial left implant, breast asymmetry  Subjective     Patient is a 44 y.o. female presents with above      Review of Systems   Pertinent items are noted in HPI, all other systems reviewed and negative    History  Past Medical History:   Diagnosis Date   • Anemia     Iron deficiency   • Anxiety    • Cancer (CMS/HCC)     Stage IIB right breast, [id number]   • Chronic narcotic use     To control restless legs and neuropathic pain.    • Depression    • GERD (gastroesophageal reflux disease)    • Hypertension    • Hypothyroid    • Lung nodule    • Ovarian abscess     Right   • Peripheral neuropathy    • RLS (restless legs syndrome)    • Splenic artery aneurysm (CMS/HCC)     1 CM AS OF 2016   • Tachycardia      Past Surgical History:   Procedure Laterality Date   •  SECTION      x2 in  and    • COLONOSCOPY N/A 2018    Procedure: COLONOSCOPY TO CECUM & T.I.;  Surgeon: Dong Quinones MD;  Location: [facility] ENDOSCOPY;  Service: Gastroenterology   • ENDOSCOPY N/A 2018    Procedure: ESOPHAGOGASTRODUODENOSCOPY WITH COLD BIOPSIES;  Surgeon: Dong Quinones MD;  Location: [facility] ENDOSCOPY;  Service: Gastroenterology   • HYSTERECTOMY      and bilateral oophorectomy, not finding any malignancy in the surgical specimens of the uterus, cervix, fallopian tubes or ovaries.    • LAPAROSCOPIC SALPINGOOPHERECTOMY Right     Laparoscopic oophorectomy and salpingectomy for ovarian abscess   • MASTECTOMY Right     mastectomy    • ROBOTIC ASSISTED GYNECOLOGIC PROCEDURE  2013    For development of abdominal and pelvic pain. She had a hemorrhagic cyst in the left ovary that was removed.      No medications prior to  admission.     Allergies:  Patient has no known allergies.    Objective     Vital Signs       Physical Exam:      General Appearance:    Alert, cooperative, in no acute distress   Head:    Normocephalic, without obvious abnormality, atraumatic   Eyes:            Lids and lashes normal, conjunctivae and sclerae normal, no   icterus, no pallor, corneas clear, PERRLA   Ears:    Ears appear intact with no abnormalities noted   Throat:   No oral lesions, no thrush, oral mucosa moist   Neck:   No adenopathy, supple, trachea midline, no thyromegaly, no     carotid bruit, no JVD   Back:     No kyphosis present, no scoliosis present, no skin lesions,       erythema or scars, no tenderness to percussion or                   palpation,   range of motion normal   Lungs:     Clear to auscultation,respirations regular, even and                   unlabored    Heart:    Regular rhythm and normal rate, normal S1 and S2, no            murmur, no gallop, no rub, no click   Breast Exam:   left medial fold in implant, slightly smaller on left   Abdomen:     Normal bowel sounds, no masses, no organomegaly, soft        non-tender, non-distended, no guarding, no rebound                 tenderness   Genitalia:    Deferred   Extremities:   Moves all extremities well, no edema, no cyanosis, no              redness   Pulses:   Pulses palpable and equal bilaterally   Skin:   No bleeding, bruising or rash   Lymph nodes:   No palpable adenopathy   Neurologic:   Cranial nerves 2 - 12 grossly intact, sensation intact, DTR        present and equal bilaterally              Assessment/Plan       * No active hospital problems. *      Fold in medial left implant asymmetry, bilateral implant replacement    I discussed the patients findings and my recommendations with patient.     Maurine Waterhouse, MD  03/12/19  9:08 AM

## 2019-03-12 NOTE — ANESTHESIA POSTPROCEDURE EVALUATION
"Patient: Rosana Griffin    Procedure Summary     Date:  03/12/19 Room / Location:  [facility] OR 04 / [facility] MAIN OR    Anesthesia Start:  1352 Anesthesia Stop:  1604    Procedure:  BREAST REMOVE/REPLACE BREAST IMPLANTS (Bilateral Breast) Diagnosis:      Surgeon:  Waterhouse, Maurine, MD Provider:  Jn Fitzpatrick MD    Anesthesia Type:  general ASA Status:  2          Anesthesia Type: general  Last vitals  BP   136/88 (03/12/19 1724)   Temp   37.4 °C (99.4 °F) (03/12/19 1603)   Pulse   92 (03/12/19 1724)   Resp   16 (03/12/19 1724)     SpO2   97 % (03/12/19 1720)     Post Anesthesia Care and Evaluation    Patient location during evaluation: bedside  Patient participation: complete - patient participated  Level of consciousness: sleepy but conscious  Pain score: 0  Pain management: adequate  Airway patency: patent  Anesthetic complications: No anesthetic complications    Cardiovascular status: acceptable  Respiratory status: acceptable  Hydration status: acceptable    Comments: /88 (BP Location: Left arm, Patient Position: Lying)   Pulse 92   Temp 37.4 °C (99.4 °F) (Oral)   Resp 16   Ht 162.6 cm (64\")   Wt 67.4 kg (148 lb 8 oz)   SpO2 97%   BMI 25.49 kg/m²         "

## 2019-03-12 NOTE — BRIEF OP NOTE
BREAST AUGMENTATION BILATERAL  Progress Note    Rosana Griffin  3/12/2019    Pre-op Diagnosis:   Palpable fold medial left implant       Post-Op Diagnosis Codes:  same    Procedure/CPT® Codes:      Procedure(s):  BREAST REMOVE/REPLACE BREAST IMPLANTS    Surgeon(s):  Waterhouse, Maurine, MD    Anesthesia: General    Staff:   Circulator: Julita Hernandez RN; Azalia Granados RN  Scrub Person: Bernadette Lawrence    Estimated Blood Loss: scant    Urine Voided: * No values recorded between 3/12/2019  1:46 PM and 3/12/2019  4:03 PM *    Specimens:                none      Drains:  NA    Findings:routine    Complications: none    Maurine Waterhouse, MD     Date: 3/12/2019  Time: 4:18 PM

## 2019-04-07 ENCOUNTER — APPOINTMENT (OUTPATIENT)
Dept: GENERAL RADIOLOGY | Facility: HOSPITAL | Age: 44
End: 2019-04-07

## 2019-04-07 PROCEDURE — 73630 X-RAY EXAM OF FOOT: CPT | Performed by: EMERGENCY MEDICINE

## 2019-04-08 ENCOUNTER — TELEPHONE (OUTPATIENT)
Dept: ORTHOPEDIC SURGERY | Facility: CLINIC | Age: 44
End: 2019-04-08

## 2019-04-08 ENCOUNTER — OFFICE VISIT (OUTPATIENT)
Dept: ORTHOPEDIC SURGERY | Facility: CLINIC | Age: 44
End: 2019-04-08

## 2019-04-08 VITALS — WEIGHT: 149.2 LBS | TEMPERATURE: 98 F | HEIGHT: 64 IN | BODY MASS INDEX: 25.47 KG/M2

## 2019-04-08 DIAGNOSIS — S93.401A SPRAIN OF RIGHT ANKLE, UNSPECIFIED LIGAMENT, INITIAL ENCOUNTER: ICD-10-CM

## 2019-04-08 DIAGNOSIS — S92.351A CLOSED DISPLACED FRACTURE OF FIFTH METATARSAL BONE OF RIGHT FOOT, INITIAL ENCOUNTER: ICD-10-CM

## 2019-04-08 DIAGNOSIS — S86.301A INJURY OF PERONEAL TENDON OF RIGHT FOOT, INITIAL ENCOUNTER: Primary | ICD-10-CM

## 2019-04-08 PROCEDURE — 28470 CLTX METATARSAL FX WO MNP EA: CPT | Performed by: ORTHOPAEDIC SURGERY

## 2019-04-08 PROCEDURE — 99204 OFFICE O/P NEW MOD 45 MIN: CPT | Performed by: ORTHOPAEDIC SURGERY

## 2019-04-08 NOTE — TELEPHONE ENCOUNTER
Patient has a fifth metatarsal fx of the Rt foot. Was seen at [facility] EP xray in [facility].

## 2019-04-08 NOTE — PROGRESS NOTES
New Patient Complaint      Patient: Rosana Griffin  YOB: 1975 44 y.o. female  Medical Record Number: [id number]    Chief Complaints: I hurt my foot and ankle    History of Present Illness: Patient injured her right foot and ankle on 4/6/2019 when she rolled her foot while walking in the field to her car.  She had persistent complaints of severe constant aching pain in the inferolateral aspect of the right hindfoot and posterior lateral ankle with associated redness bruising and swelling worse with standing driving and walking improved with rest.    She was seen at the ER facility at [place] with 1/5 metatarsal fracture and is here today for further evaluation.        HPI    Allergies: No Known Allergies    Medications:   Current Outpatient Medications on File Prior to Visit   Medication Sig   • ALPRAZolam (XANAX) 0.5 MG tablet Take 0.5 mg by mouth at night as needed for anxiety.   • atenolol (TENORMIN) 25 MG tablet Take 25 mg by mouth Every Morning.   • fentaNYL (DURAGESIC) 50 MCG/HR patch Place 1 patch on the skin as directed by provider Every 72 (Seventy-Two) Hours.   • gabapentin (NEURONTIN) 300 MG capsule Take 300 mg by mouth 3 (Three) Times a Day.   • levothyroxine (SYNTHROID, LEVOTHROID) 25 MCG tablet Take 25 mcg by mouth Every Morning.   • pantoprazole (PROTONIX) 40 MG EC tablet Take 1 tablet by mouth Daily. (Patient taking differently: Take 40 mg by mouth Every Morning.)   • traMADol (ULTRAM) 50 MG tablet Take 1 tablet by mouth Every 6 (Six) Hours As Needed for Moderate Pain .   • [DISCONTINUED] HYDROcodone-acetaminophen (NORCO) 5-325 MG per tablet Take 1 tablet by mouth Every 6 (Six) Hours As Needed For Pain.     No current facility-administered medications on file prior to visit.        Past Medical History:   Diagnosis Date   • Anemia     Iron deficiency   • Anxiety    • Cancer (CMS/HCC) 2012    Stage IIB right breast, [id number]   • Chronic narcotic use     To control restless legs and  neuropathic pain.    • Depression    • GERD (gastroesophageal reflux disease)    • Hypertension    • Hypothyroid    • Lung nodule    • Ovarian abscess 2003    Right   • Peripheral neuropathy    • RLS (restless legs syndrome)    • Splenic artery aneurysm (CMS/HCC)     1 CM AS OF 2016   • Tachycardia      Past Surgical History:   Procedure Laterality Date   • BREAST AUGMENTATION Bilateral 3/12/2019    Procedure: BREAST REMOVE/REPLACE BREAST IMPLANTS;  Surgeon: Waterhouse, Maurine, MD;  Location: [facility] MAIN OR;  Service: Plastics   •  SECTION      x2 in  and    • COLONOSCOPY N/A 2018    Procedure: COLONOSCOPY TO CECUM & T.I.;  Surgeon: Dong Quinones MD;  Location: [facility] ENDOSCOPY;  Service: Gastroenterology   • ENDOSCOPY N/A 2018    Procedure: ESOPHAGOGASTRODUODENOSCOPY WITH COLD BIOPSIES;  Surgeon: Dong Quinones MD;  Location: [facility] ENDOSCOPY;  Service: Gastroenterology   • HYSTERECTOMY      and bilateral oophorectomy, not finding any malignancy in the surgical specimens of the uterus, cervix, fallopian tubes or ovaries.    • LAPAROSCOPIC SALPINGOOPHERECTOMY Right     Laparoscopic oophorectomy and salpingectomy for ovarian abscess   • MASTECTOMY Right     mastectomy    • ROBOTIC ASSISTED GYNECOLOGIC PROCEDURE  2013    For development of abdominal and pelvic pain. She had a hemorrhagic cyst in the left ovary that was removed.      Social History     Occupational History   • Occupation: Radiology clerk     Employer: [employer]   Tobacco Use   • Smoking status: Never Smoker   • Smokeless tobacco: Never Used   Substance and Sexual Activity   • Alcohol use: No   • Drug use: No   • Sexual activity: Yes     Partners: Male     Birth control/protection: None      Social History     Social History Narrative   • Not on file     Family History   Problem Relation Age of Onset   • Hypertension Mother    • Atrial fibrillation Son         Required ablatopm   •  "Breast cancer Neg Hx    • Ovarian cancer Neg Hx    • Cancer Neg Hx    • Malig Hyperthermia Neg Hx        Review of Systems: 14 point review of systems performed, positive pertinent findings identified in HPI. All remaining systems negative     Review of Systems      Physical Exam:   Vitals:    04/08/19 1519   Temp: 98 °F (36.7 °C)   TempSrc: Temporal   Weight: 67.7 kg (149 lb 3.2 oz)   Height: 162.6 cm (64\")     Physical Exam   Constitutional: pleasant, well developed   Eyes: sclera non icteric  Hearing : adequate for exam  Cardiovascular: palpable pulses in right foot, right calf/ thigh NT without sign of DVT  Respiratoy: breathing unlabored   Neurological: grossly sensate to LT throughout right LE  Psychiatric: oriented with normal mood and affect.   Lymphatic: No palpable popliteal lymphadenopathy right LE  Skin: intact throughout right leg/foot  Musculoskeletal: Right ankle shows very slight swelling and mild discomfort over the anterolateral ligaments today as well as mild discomfort on the peroneal tendons posterior laterally and inferolaterally.  Difficult to assess peroneal stability due to hindfoot/fifth metatarsal base pain and ankle is grossly stable but there was some guarding on ligamentous exam.  There is moderate discomfort at the base of the fifth metatarsal but no focal discomfort over the forefoot or medial midfoot  Physical Exam  Ortho Exam    Radiology: 3 views of the right foot reviewed on the [employer] system from 4/7/2019 show a fracture of the base of the fifth metatarsal with some intra-articular involvement but no clear step-off and only mild distraction at the fracture site.    Assessment/Plan: 1.  Right fifth metatarsal base fracture  2.  Right peroneal tendon injury  3.  Right ankle anterolateral sprain    We discussed treatment options and I would not recommend anything from a surgical standpoint at this time given the configuration of her fracture and will hold off on an MRI of her ankle " as this would not change our current treatment protocol.    She may do touchdown weightbearing with a boot and crutches and keep this elevated.  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    I will see her back in 2-3 weeks x-rays of her right foot and ankle  Answers for HPI/ROS submitted by the patient on 4/8/2019   Lower extremity pain  Incident occurred: 2 days ago  Incident location: other  Injury mechanism: a twisting injury  Pain location: right foot  Pain quality: stabbing  Pain - numeric: 4/10  Pain course: constant  tingling: No  inability to bear weight: Yes  loss of motion: Yes  muscle weakness: No  Foreign body present: no foreign bodies

## 2019-04-09 PROBLEM — S93.401A SPRAIN OF RIGHT ANKLE: Status: ACTIVE | Noted: 2019-04-09

## 2019-04-12 ENCOUNTER — TELEPHONE (OUTPATIENT)
Dept: ORTHOPEDIC SURGERY | Facility: CLINIC | Age: 44
End: 2019-04-12

## 2019-04-29 ENCOUNTER — OFFICE VISIT (OUTPATIENT)
Dept: ORTHOPEDIC SURGERY | Facility: CLINIC | Age: 44
End: 2019-04-29

## 2019-04-29 VITALS — TEMPERATURE: 98.2 F | BODY MASS INDEX: 25.44 KG/M2 | HEIGHT: 64 IN | WEIGHT: 149 LBS

## 2019-04-29 DIAGNOSIS — S93.401D SPRAIN OF RIGHT ANKLE, UNSPECIFIED LIGAMENT, SUBSEQUENT ENCOUNTER: ICD-10-CM

## 2019-04-29 DIAGNOSIS — S86.301D INJURY OF PERONEAL TENDON OF RIGHT FOOT, SUBSEQUENT ENCOUNTER: Primary | ICD-10-CM

## 2019-04-29 DIAGNOSIS — S92.351D CLOSED DISPLACED FRACTURE OF FIFTH METATARSAL BONE OF RIGHT FOOT WITH ROUTINE HEALING, SUBSEQUENT ENCOUNTER: ICD-10-CM

## 2019-04-29 PROCEDURE — 99213 OFFICE O/P EST LOW 20 MIN: CPT | Performed by: ORTHOPAEDIC SURGERY

## 2019-04-29 PROCEDURE — 73630 X-RAY EXAM OF FOOT: CPT | Performed by: ORTHOPAEDIC SURGERY

## 2019-04-29 PROCEDURE — 73600 X-RAY EXAM OF ANKLE: CPT | Performed by: ORTHOPAEDIC SURGERY

## 2019-04-29 NOTE — PROGRESS NOTES
"Foot Follow Up      Patient: Rosana Griffin    YOB: 1975 44 y.o. female    Chief Complaints: Foot and ankle are feeling better    History of Present Illness: Patient was seen initially on 4/8/2019 after injuring her right foot and ankle on 4/6/2019.  She was diagnosed with right fifth metatarsal base fracture as well as right ankle anterolateral ligamentous sprain and possible peroneal tendon injury.  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    She states she is only been using her crutches intermittently and not really at all around the house.  Her ankle pain and foot pain are improving with only slight achiness  HPI    ROS: Foot pain, ankle pain, no fevers chills chest pain shortness of breath  Past Medical History:   Diagnosis Date   • Anemia     Iron deficiency   • Anxiety    • Cancer (CMS/HCC) 2012    Stage IIB right breast, [id number]   • Chronic narcotic use     To control restless legs and neuropathic pain.    • Closed displaced fracture of fifth metatarsal bone of right foot 4/8/2019   • Depression    • GERD (gastroesophageal reflux disease)    • Hypertension    • Hypothyroid    • Lung nodule    • Ovarian abscess 2003    Right   • Peripheral neuropathy    • RLS (restless legs syndrome)    • Splenic artery aneurysm (CMS/HCC)     1 CM AS OF 5/2016   • Tachycardia      Physical Exam:   Vitals:    04/29/19 1532   Temp: 98.2 °F (36.8 °C)   Weight: 67.6 kg (149 lb)   Height: 162.6 cm (64\")     Well developed with normal mood.  Right ankle shows only slight discomfort to palpation of the anterolateral ligamentous structures with grossly stable anterior drawer with slight guarding.  Completely nontender along the peroneal tendons and slight discomfort to palpation on the base of the fifth metatarsal      Radiology: 2 views of the right ankle and 3 views of the " right foot ordered to evaluate fracture alignment and pain reviewed and compared to previous x-rays.  The fifth metatarsal base fracture appears to remain well aligned joint is congruent without significant displacement.  Talus remains well-seated within the mortise with no widening of the medial clear space or syndesmosis.      Assessment/Plan: 1.  Right fifth metatarsal base fracture  2.  Right ankle anterolateral ligamentous sprain without evidence at this time peroneal injury    Reviewed treatment options and the fracture without sign of displacement I would not recommend surgical treatment for the foot or the ankle at this time.    I have her continue with her boot for now if she has any increase in pain she will practice.    We will see her back in 2 weeks with x-rays of her foot to determine if she can return to work without her boot.  We will allow her to go with just her boot for now but if she has any increase in pain to get off this and let me know.  I will see her back in 2 weeks x-rays of her foot to evaluate if she can go back to work without her boot.

## 2019-05-02 DIAGNOSIS — S92.351A DISPLACED FRACTURE OF FIFTH METATARSAL BONE, RIGHT FOOT, INITIAL ENCOUNTER FOR CLOSED FRACTURE: ICD-10-CM

## 2019-05-02 RX ORDER — TRAMADOL HYDROCHLORIDE 50 MG/1
50 TABLET ORAL EVERY 6 HOURS PRN
Qty: 30 TABLET | Refills: 0 | OUTPATIENT
Start: 2019-05-02

## 2019-05-02 NOTE — TELEPHONE ENCOUNTER
I called and spoke with patient about her request for tramadol refill.  Her [place] shows that she has been getting pain medicine also from Dr. Wagner with a 30-day supply of Percocet on 4/11/2019.  Counseled her that is not a good idea to continue getting pain medications from 2 different sources and recommended that she check with him guarding any further pain medications.  She voiced a clear understanding and seemed okay with this.

## 2019-05-09 ENCOUNTER — OFFICE VISIT (OUTPATIENT)
Dept: ORTHOPEDIC SURGERY | Facility: CLINIC | Age: 44
End: 2019-05-09

## 2019-05-09 VITALS — HEIGHT: 64 IN | TEMPERATURE: 97.8 F | BODY MASS INDEX: 25.27 KG/M2 | WEIGHT: 148 LBS

## 2019-05-09 DIAGNOSIS — S92.351D CLOSED DISPLACED FRACTURE OF FIFTH METATARSAL BONE OF RIGHT FOOT WITH ROUTINE HEALING, SUBSEQUENT ENCOUNTER: ICD-10-CM

## 2019-05-09 DIAGNOSIS — M79.671 RIGHT FOOT PAIN: Primary | ICD-10-CM

## 2019-05-09 DIAGNOSIS — S93.401D SPRAIN OF RIGHT ANKLE, UNSPECIFIED LIGAMENT, SUBSEQUENT ENCOUNTER: ICD-10-CM

## 2019-05-09 PROCEDURE — 99024 POSTOP FOLLOW-UP VISIT: CPT | Performed by: ORTHOPAEDIC SURGERY

## 2019-05-09 PROCEDURE — 73630 X-RAY EXAM OF FOOT: CPT | Performed by: ORTHOPAEDIC SURGERY

## 2019-05-09 NOTE — PROGRESS NOTES
"Foot Follow Up      Patient: Rosana Griffin    YOB: 1975 44 y.o. female    Chief Complaints: Foot and ankle feel much better    History of Present Illness:Patient was seen initially on 4/8/2019 after injuring her right foot and ankle on 4/6/2019.  She was diagnosed with right fifth metatarsal base fracture as well as right ankle anterolateral ligamentous sprain and possible peroneal tendon injury.    We decided to hold on further imaging.    She was last seen here on 4/29/2019 with improvement in her pain and was instructed to continue with her boot    She stopped using her boot 2 days ago and has been using tennis shoes and \"doing just fine\".  She is also been going barefoot at home.  She has no complaints of pain in the base of the fifth metatarsal or anterolateral ankle and has not had any feelings of instability or pain along the peroneal tendons  HPI    ROS: No foot pain  Past Medical History:   Diagnosis Date   • Anemia     Iron deficiency   • Anxiety    • Cancer (CMS/HCC) 2012    Stage IIB right breast, [id number]   • Chronic narcotic use     To control restless legs and neuropathic pain.    • Closed displaced fracture of fifth metatarsal bone of right foot 4/8/2019   • Depression    • GERD (gastroesophageal reflux disease)    • Hypertension    • Hypothyroid    • Lung nodule    • Ovarian abscess 2003    Right   • Peripheral neuropathy    • RLS (restless legs syndrome)    • Splenic artery aneurysm (CMS/HCC)     1 CM AS OF 5/2016   • Tachycardia      Physical Exam:   Vitals:    05/09/19 0942   Temp: 97.8 °F (36.6 °C)   Weight: 67.1 kg (148 lb)   Height: 162.6 cm (64\")     Well developed with normal mood.  On exam she had no focal tenderness of the base of fifth metatarsal and no pain over the anterolateral ankle or peroneal tendons.  No pain with eversion and stable anterior drawer.      Radiology: 3 views of the right foot ordered to evaluate fifth metatarsal base fracture alignment reviewed and " compared with previous x-rays fifth metatarsal base fracture is without appreciable change in alignment overall joint appears congruent but fracture line is still evident.      Assessment/Plan:  1.  Right fifth metatarsal base fracture  2.  Right ankle anterolateral ligamentous sprain without evidence  peroneal injury    I am encouraged that her pain has resolved but reviewed with her that her fracture is still healing and evident on x-ray.    We will allow her to continue and started accommodative shoes but avoid barefoot ambulation in the house but use her boot when she is out of the house and especially at work which she will return to on 5/13/2019.    I will see her back in 3 weeks x-rays of her right foot

## 2019-05-30 ENCOUNTER — TELEPHONE (OUTPATIENT)
Dept: ORTHOPEDIC SURGERY | Facility: CLINIC | Age: 44
End: 2019-05-30

## 2019-05-30 NOTE — TELEPHONE ENCOUNTER
Patient had to reschedule her appt today due to a family emergency. This is a fracture f/u so I put patient in a Same Day appt spot next Wednesday, 6/5. She says her foot seems better. Is this ok?

## 2019-06-05 ENCOUNTER — OFFICE VISIT (OUTPATIENT)
Dept: ORTHOPEDIC SURGERY | Facility: CLINIC | Age: 44
End: 2019-06-05

## 2019-06-05 VITALS — BODY MASS INDEX: 25.27 KG/M2 | WEIGHT: 148 LBS | TEMPERATURE: 98.2 F | HEIGHT: 64 IN

## 2019-06-05 DIAGNOSIS — S92.351D CLOSED DISPLACED FRACTURE OF FIFTH METATARSAL BONE OF RIGHT FOOT WITH ROUTINE HEALING, SUBSEQUENT ENCOUNTER: Primary | ICD-10-CM

## 2019-06-05 DIAGNOSIS — S93.401D SPRAIN OF RIGHT ANKLE, UNSPECIFIED LIGAMENT, SUBSEQUENT ENCOUNTER: ICD-10-CM

## 2019-06-05 PROCEDURE — 73630 X-RAY EXAM OF FOOT: CPT | Performed by: ORTHOPAEDIC SURGERY

## 2019-06-05 PROCEDURE — 99212 OFFICE O/P EST SF 10 MIN: CPT | Performed by: ORTHOPAEDIC SURGERY

## 2019-06-05 NOTE — PROGRESS NOTES
"Ankle Follow Up      Patient: Rosana Griffin    YOB: 1975 44 y.o. female    Chief Complaints: Ankle and foot feel fine    History of Present Illness: Patient was initially seen on 4/8/2019 after injuring her right foot and ankle on 4/6/2019 with a right fifth metatarsal base fracture and right ankle anterolateral ligamentous sprain.    She was last seen on 5/9/2019 and has since weaned out of her boot and has no complaints of pain at the ankle or foot and was able to ask to walk her dog and tennis shoes.  She is been out of her boot in the house for the last week or so.    HPI    ROS: No foot or ankle pain  Past Medical History:   Diagnosis Date   • Anemia     Iron deficiency   • Anxiety    • Cancer (CMS/HCC) 2012    Stage IIB right breast, [id number]   • Chronic narcotic use     To control restless legs and neuropathic pain.    • Closed displaced fracture of fifth metatarsal bone of right foot 4/8/2019   • Depression    • GERD (gastroesophageal reflux disease)    • Hypertension    • Hypothyroid    • Lung nodule    • Ovarian abscess 2003    Right   • Peripheral neuropathy    • RLS (restless legs syndrome)    • Splenic artery aneurysm (CMS/HCC)     1 CM AS OF 5/2016   • Tachycardia        Physical Exam:   Vitals:    06/05/19 1042   Temp: 98.2 °F (36.8 °C)   TempSrc: Temporal   Weight: 67.1 kg (148 lb)   Height: 162.6 cm (64\")   PainSc: 0-No pain   PainLoc: Foot     Well developed with normal mood.  On exam she was nontender over the anterolateral aspect of the ankle with stable anterior drawer no pain to the peroneal tendons.  No pain to palpation or manipulation on the base the fifth metatarsal.  No pain with 5 out of 5 eversion strength      Radiology: 3 views of the right foot ordered to evaluate fifth metatarsal fracture reviewed and compared to previous x-rays.  Fracture appears to be healing without significant change in alignment with interval callus formation.      Assessment/Plan:  1.  Right fifth " metatarsal base fracture  2.  Right ankle anterolateral ligamentous sprain without evidence  peroneal injury    Overall she has continued to improve and not having any pain but I would not recommend any further treatment for her.    She may continue weaning out of her boot to athletic shoes and if pain recurs she will let me know otherwise I will see her back as needed

## 2019-07-01 RX ORDER — TRAMADOL HYDROCHLORIDE 50 MG/1
50 TABLET ORAL 2 TIMES DAILY
Qty: 60 TABLET | Refills: 0 | OUTPATIENT
Start: 2019-07-01 | End: 2019-07-29 | Stop reason: SDUPTHER

## 2019-07-28 RX ORDER — TRAMADOL HYDROCHLORIDE 50 MG/1
50 TABLET ORAL 2 TIMES DAILY
Qty: 60 TABLET | Refills: 0 | Status: CANCELLED | OUTPATIENT
Start: 2019-07-28

## 2019-10-17 RX ORDER — TRAMADOL HYDROCHLORIDE 50 MG/1
50 TABLET ORAL 2 TIMES DAILY
Qty: 60 TABLET | Refills: 2 | Status: CANCELLED | OUTPATIENT
Start: 2019-10-17

## 2019-11-22 RX ORDER — CARBAMAZEPINE 200 MG/1
200 TABLET ORAL 2 TIMES DAILY
Qty: 60 TABLET | Refills: 0 | Status: SHIPPED | OUTPATIENT
Start: 2019-11-22 | End: 2020-11-12

## 2020-08-10 ENCOUNTER — TRANSCRIBE ORDERS (OUTPATIENT)
Dept: ADMINISTRATIVE | Facility: HOSPITAL | Age: 45
End: 2020-08-10

## 2020-08-10 DIAGNOSIS — I72.8 ANEURYSM OF OTHER SPECIFIED ARTERIES (HCC): Primary | ICD-10-CM

## 2020-08-24 ENCOUNTER — HOSPITAL ENCOUNTER (OUTPATIENT)
Dept: CT IMAGING | Facility: HOSPITAL | Age: 45
Discharge: HOME | End: 2020-08-24
Admitting: FAMILY MEDICINE

## 2020-08-24 DIAGNOSIS — I72.8 ANEURYSM OF OTHER SPECIFIED ARTERIES (HCC): ICD-10-CM

## 2020-08-24 LAB — CREAT BLDA-MCNC: 0.9 MG/DL (ref 0.6–1.3)

## 2020-08-24 PROCEDURE — 25010000002 IOPAMIDOL 61 % SOLUTION: Performed by: FAMILY MEDICINE

## 2020-08-24 PROCEDURE — 74160 CT ABDOMEN W/CONTRAST: CPT

## 2020-08-24 PROCEDURE — 82565 ASSAY OF CREATININE: CPT

## 2020-08-24 RX ADMIN — IOPAMIDOL 85 ML: 612 INJECTION, SOLUTION INTRAVENOUS at 15:36

## 2020-11-12 ENCOUNTER — E-VISIT (OUTPATIENT)
Dept: FAMILY MEDICINE CLINIC | Facility: TELEHEALTH | Age: 45
End: 2020-11-12

## 2020-11-12 DIAGNOSIS — Z76.89 RETURN TO WORK EVALUATION: Primary | ICD-10-CM

## 2020-11-12 PROCEDURE — BHEMPVIDEOVISIT: Performed by: NURSE PRACTITIONER

## 2020-11-12 PROCEDURE — 99999 PR OFFICE/OUTPT VISIT,PROCEDURE ONLY: CPT | Performed by: NURSE PRACTITIONER

## 2020-11-12 NOTE — PROGRESS NOTES
RTW E-visit reviewed. Patient reports known covid exposure 11/5. CDC guidelines and [employer] protocol require 14 days of quarantine after a known exposure before returning to work. Request to return to work denied, letter given.

## 2020-12-01 ENCOUNTER — TRANSCRIBE ORDERS (OUTPATIENT)
Dept: ADMINISTRATIVE | Facility: HOSPITAL | Age: 45
End: 2020-12-01

## 2020-12-01 ENCOUNTER — LAB (OUTPATIENT)
Dept: LAB | Facility: HOSPITAL | Age: 45
End: 2020-12-01

## 2020-12-01 DIAGNOSIS — Z79.891 ENCOUNTER FOR LONG-TERM METHADONE USE: ICD-10-CM

## 2020-12-01 DIAGNOSIS — Z79.891 ENCOUNTER FOR LONG-TERM METHADONE USE: Primary | ICD-10-CM

## 2020-12-01 PROCEDURE — 36415 COLL VENOUS BLD VENIPUNCTURE: CPT

## 2020-12-01 PROCEDURE — 80307 DRUG TEST PRSMV CHEM ANLYZR: CPT

## 2020-12-08 LAB — REF LAB TEST RESULTS: NORMAL

## 2021-01-02 ENCOUNTER — IMMUNIZATION (OUTPATIENT)
Dept: VACCINE CLINIC | Facility: HOSPITAL | Age: 46
End: 2021-01-02

## 2021-01-02 PROCEDURE — 0001A: CPT | Performed by: INTERNAL MEDICINE

## 2021-01-02 PROCEDURE — 91300 HC SARSCOV02 VAC 30MCG/0.3ML IM: CPT | Performed by: INTERNAL MEDICINE

## 2021-01-19 ENCOUNTER — APPOINTMENT (OUTPATIENT)
Dept: VACCINE CLINIC | Facility: HOSPITAL | Age: 46
End: 2021-01-19

## 2021-01-20 ENCOUNTER — HOSPITAL ENCOUNTER (OUTPATIENT)
Dept: URGENT CARE | Facility: CLINIC | Age: 46
Discharge: HOME | End: 2021-01-20
Attending: FAMILY MEDICINE

## 2021-01-21 LAB — SARS-COV-2 RNA SPEC QL NAA+PROBE: NOT DETECTED

## 2021-01-23 LAB — BACTERIA SPEC AEROBE CULT: ABNORMAL

## 2021-01-27 ENCOUNTER — IMMUNIZATION (OUTPATIENT)
Dept: VACCINE CLINIC | Facility: HOSPITAL | Age: 46
End: 2021-01-27

## 2021-01-27 PROCEDURE — 91300 HC SARSCOV02 VAC 30MCG/0.3ML IM: CPT | Performed by: INTERNAL MEDICINE

## 2021-01-27 PROCEDURE — 0002A: CPT | Performed by: INTERNAL MEDICINE

## 2021-02-06 ENCOUNTER — HOSPITAL ENCOUNTER (OUTPATIENT)
Dept: URGENT CARE | Facility: CLINIC | Age: 46
Discharge: HOME | End: 2021-02-06
Attending: FAMILY MEDICINE

## 2021-12-07 ENCOUNTER — TELEMEDICINE (OUTPATIENT)
Dept: FAMILY MEDICINE CLINIC | Facility: TELEHEALTH | Age: 46
End: 2021-12-07

## 2021-12-07 DIAGNOSIS — B34.9 VIRAL ILLNESS: Primary | ICD-10-CM

## 2021-12-07 PROCEDURE — BHEMPVIDEOVISIT: Performed by: NURSE PRACTITIONER

## 2021-12-07 RX ORDER — GABAPENTIN 300 MG/1
CAPSULE ORAL
COMMUNITY

## 2021-12-07 NOTE — PROGRESS NOTES
HPI  Rosana Griffin is a 46 y.o. female  presents with complaint of needing covid test per . [employer] employee. She reports waking up at 3:30 today with body aches, vomiting, fever up to 102. She felt fine last night. Denies cough, SOA.    No known exposure to covid or flu. Has had covid vaccine.     Review of Systems   Constitutional: Negative for fever.   HENT: Positive for rhinorrhea.    Respiratory: Negative for cough.    Gastrointestinal: Positive for nausea and vomiting. Negative for diarrhea.   Musculoskeletal: Positive for myalgias.   Neurological: Positive for headaches.       Past Medical History:   Diagnosis Date   • Anemia     Iron deficiency   • Anxiety    • Cancer (CMS/HCC) 2012    Stage IIB right breast, [id number]   • Chronic narcotic use     To control restless legs and neuropathic pain.    • Closed displaced fracture of fifth metatarsal bone of right foot 4/8/2019   • Depression    • GERD (gastroesophageal reflux disease)    • Hypertension    • Hypothyroid    • Lung nodule    • Ovarian abscess 2003    Right   • Peripheral neuropathy    • RLS (restless legs syndrome)    • Splenic artery aneurysm (CMS/HCC)     1 CM AS OF 5/2016   • Tachycardia        Family History   Problem Relation Age of Onset   • Hypertension Mother    • Atrial fibrillation Son         Required ablatopm   • Breast cancer Neg Hx    • Ovarian cancer Neg Hx    • Cancer Neg Hx    • Malig Hyperthermia Neg Hx        Social History     Socioeconomic History   • Marital status:      Spouse name: Ko   Tobacco Use   • Smoking status: Never Smoker   • Smokeless tobacco: Never Used   Substance and Sexual Activity   • Alcohol use: No   • Drug use: No   • Sexual activity: Yes     Partners: Male     Birth control/protection: None         There were no vitals taken for this visit.    PHYSICAL EXAM  Physical Exam   Constitutional: She appears well-developed and well-nourished.   HENT:   Head: Normocephalic.   Nose: Rhinorrhea present.    Neck: Neck normal appearance.  Pulmonary/Chest: Effort normal.   Neurological: She is alert.   Psychiatric: She has a normal mood and affect. Her speech is normal.       Diagnoses and all orders for this visit:    1. Viral illness (Primary)  -     COVID-19,[facility] ROUTINE, NP/OP SWAB IN TRANSPORT MEDIA OR ESWAB 72 HR TAT - Swab, Nasopharynx; Future          FOLLOW-UP  As discussed during visit with [facility], if symptoms worsen or fail to improve, follow-up with PCP/Urgent Care/Emergency Department.    Patient verbalizes understanding of medications, instructions for treatment and follow-up.    ETHEL Guardado  12/07/2021  11:04 EST    This visit was performed via Telehealth.  This patient has been instructed to follow-up with their primary care provider if their symptoms worsen or the treatment provided does not resolve their illness.    Rosana Griffin verbally consented to a telehealth visit. Rosana Griffin was seen via telehealth using real-time video conferencing technology by ETHEL Guardado. Rosana Griffin was located at [place], [place], and ETHEL Guardado was located in [place], [place].

## 2021-12-07 NOTE — PATIENT INSTRUCTIONS
Viral Illness, Adult  Viruses are tiny germs that can get into a person's body and cause illness. There are many different types of viruses, and they cause many types of illness. Viral illnesses can range from mild to severe. They can affect various parts of the body.  Short-term conditions that are caused by a virus include colds and the flu (influenza). Long-term conditions that are caused by a virus include herpes, shingles, and HIV (human immunodeficiency virus) infection. A few viruses have been linked to certain cancers.  What are the causes?  Many types of viruses can cause illness. Viruses invade cells in your body, multiply, and cause the infected cells to work abnormally or die. When these cells die, they release more of the virus. When this happens, you develop symptoms of the illness, and the virus continues to spread to other cells. If the virus takes over the function of the cell, it can cause the cell to divide and grow out of control. This happens when a virus causes cancer.  Different viruses get into the body in different ways. You can get a virus by:  · Swallowing food or water that has come in contact with the virus (is contaminated).  · Breathing in droplets that have been coughed or sneezed into the air by an infected person.  · Touching a surface that has been contaminated with the virus and then touching your eyes, nose, or mouth.  · Being bitten by an insect or animal that carries the virus.  · Having sexual contact with a person who is infected with the virus.  · Being exposed to blood or fluids that contain the virus, either through an open cut or during a transfusion.  If a virus enters your body, your body's defense system (immune system) will try to fight the virus. You may be at higher risk for a viral illness if your immune system is weak.  What are the signs or symptoms?  You may have these symptoms, depending on the type of virus and the location of the cells that it  invades:  · Cold and flu viruses:  ? Fever.  ? Headache.  ? Sore throat.  ? Muscle aches.  ? Stuffy nose (nasal congestion).  ? Cough.  · Digestive system (gastrointestinal) viruses:  ? Fever.  ? Pain in the abdomen.  ? Nausea.  ? Diarrhea.  · Liver viruses (hepatitis):  ? Loss of appetite.  ? Tiredness.  ? Skin or the white parts of your eyes turning yellow (jaundice).  · Brain and spinal cord viruses:  ? Fever.  ? Headache.  ? Stiff neck.  ? Nausea and vomiting.  ? Confusion or sleepiness.  · Skin viruses:  ? Warts.  ? Itching.  ? Rash.  · Sexually transmitted viruses:  ? Discharge.  ? Swelling.  ? Redness.  ? Rash.  How is this diagnosed?  This condition may be diagnosed based on one or more of the following:  · Symptoms.  · Medical history.  · Physical exam.  · Blood test, sample of mucus from your lungs (sputum sample), stool sample, or a swab of body fluids or a skin sore (lesion).  How is this treated?  Viruses can be hard to treat because they live within cells. Antibiotic medicines do not treat viruses because these medicines do not get inside cells. Treatment for a viral illness may include:  · Resting and drinking plenty of fluids.  · Medicines to relieve symptoms. These can include over-the-counter medicine for pain and fever, medicines for cough or congestion, and medicines to relieve diarrhea.  · Antiviral medicines. These medicines are available only for certain types of viruses.  Some viral illnesses can be prevented with vaccinations. A common example is the flu shot.  Follow these instructions at home:  Medicines  · Take over-the-counter and prescription medicines only as told by your health care provider.  · If you were prescribed an antiviral medicine, take it as told by your health care provider. Do not stop taking the antiviral even if you start to feel better.  · Be aware of when antibiotics are needed and when they are not needed. Antibiotics do not treat viruses. You may get an antibiotic if  your health care provider thinks that you may have, or are at risk for, a bacterial infection and you have a viral infection.  ? Do not ask for an antibiotic prescription if you have been diagnosed with a viral illness. Antibiotics will not make your illness go away faster.  ? Frequently taking antibiotics when they are not needed can lead to antibiotic resistance. When this develops, the medicine no longer works against the bacteria that it normally fights.  General instructions    · Drink enough fluids to keep your urine pale yellow.  · Rest as much as possible.  · Return to your normal activities as told by your health care provider. Ask your health care provider what activities are safe for you.  · Keep all follow-up visits as told by your health care provider. This is important.    How is this prevented?  To reduce your risk of viral illness:  · Wash your hands often with soap and water for at least 20 seconds. If soap and water are not available, use hand .  · Avoid touching your nose, eyes, and mouth, especially if you have not washed your hands recently.  · If anyone in your household has a viral infection, clean all household surfaces that may have been in contact with the virus. Use soap and hot water. You may also use bleach that you have added water to (diluted).  · Stay away from people who are sick with symptoms of a viral infection.  · Do not share items such as toothbrushes and water bottles with other people.  · Keep your vaccinations up to date. This includes getting a yearly flu shot.  · Eat a healthy diet and get plenty of rest.  Contact a health care provider if:  · You have symptoms of a viral illness that do not go away.  · Your symptoms come back after going away.  · Your symptoms get worse.  Get help right away if you have:  · Trouble breathing.  · A severe headache or a stiff neck.  · Severe vomiting or pain in your abdomen.  These symptoms may represent a serious problem that is  an emergency. Do not wait to see if the symptoms will go away. Get medical help right away. Call your local emergency services (911 in the [place] Do not drive yourself to the hospital.  Summary  · Viruses are types of germs that can get into a person's body and cause illness. Viral illnesses can range from mild to severe. They can affect various parts of the body.  · Viruses can be hard to treat. There are medicines to relieve symptoms, and there are some antiviral medicines.  · If you were prescribed an antiviral medicine, take it as told by your health care provider. Do not stop taking the antiviral even if you start to feel better.  · Contact a health care provider if you have symptoms of a viral illness that do not go away.  This information is not intended to replace advice given to you by your health care provider. Make sure you discuss any questions you have with your health care provider.  Document Revised: 05/03/2021 Document Reviewed: 10/27/2020  [employer] Patient Education © 2021 [employer]

## 2021-12-08 PROCEDURE — 87635 SARS-COV-2 COVID-19 AMP PRB: CPT | Performed by: FAMILY MEDICINE

## 2021-12-10 ENCOUNTER — IMMUNIZATION (OUTPATIENT)
Dept: VACCINE CLINIC | Facility: HOSPITAL | Age: 46
End: 2021-12-10

## 2021-12-10 PROCEDURE — 0004A HC ADM SARSCOV2 30MCG/0.3ML BOOSTER: CPT | Performed by: INTERNAL MEDICINE

## 2021-12-10 PROCEDURE — 91300 HC SARSCOV02 VAC 30MCG/0.3ML IM: CPT | Performed by: INTERNAL MEDICINE

## 2022-01-27 ENCOUNTER — TELEPHONE (OUTPATIENT)
Dept: SURGERY | Facility: CLINIC | Age: 47
End: 2022-01-27

## 2022-01-27 NOTE — TELEPHONE ENCOUNTER
Pt had a mastectomy 10 yrs ago with you.  She is needing an updated work note stating that she still cannot lift or pull over 10 lbs with her Rt arm.      OK to send?

## 2022-02-06 ENCOUNTER — HOSPITAL ENCOUNTER (EMERGENCY)
Facility: HOSPITAL | Age: 47
Discharge: HOME | End: 2022-02-06
Admitting: EMERGENCY MEDICINE

## 2022-02-06 VITALS
TEMPERATURE: 98.2 F | RESPIRATION RATE: 18 BRPM | SYSTOLIC BLOOD PRESSURE: 175 MMHG | HEIGHT: 64 IN | BODY MASS INDEX: 27.59 KG/M2 | HEART RATE: 128 BPM | DIASTOLIC BLOOD PRESSURE: 105 MMHG | OXYGEN SATURATION: 97 % | WEIGHT: 161.6 LBS

## 2022-02-06 DIAGNOSIS — K02.9 PAIN DUE TO DENTAL CARIES: Primary | ICD-10-CM

## 2022-02-06 PROCEDURE — 99283 EMERGENCY DEPT VISIT LOW MDM: CPT

## 2022-02-06 PROCEDURE — 25010000002 KETOROLAC TROMETHAMINE PER 15 MG: Performed by: NURSE PRACTITIONER

## 2022-02-06 PROCEDURE — 96372 THER/PROPH/DIAG INJ SC/IM: CPT

## 2022-02-06 RX ORDER — CEPHALEXIN 500 MG/1
500 CAPSULE ORAL 4 TIMES DAILY
Qty: 28 CAPSULE | Refills: 0 | Status: SHIPPED | OUTPATIENT
Start: 2022-02-06

## 2022-02-06 RX ORDER — CEPHALEXIN 500 MG/1
500 CAPSULE ORAL 4 TIMES DAILY
Qty: 28 CAPSULE | Refills: 0 | Status: SHIPPED | OUTPATIENT
Start: 2022-02-06 | End: 2022-02-06 | Stop reason: RX

## 2022-02-06 RX ORDER — KETOROLAC TROMETHAMINE 30 MG/ML
60 INJECTION, SOLUTION INTRAMUSCULAR; INTRAVENOUS ONCE
Status: COMPLETED | OUTPATIENT
Start: 2022-02-06 | End: 2022-02-06

## 2022-02-06 RX ORDER — OXYCODONE AND ACETAMINOPHEN 10; 325 MG/1; MG/1
1 TABLET ORAL ONCE AS NEEDED
Status: COMPLETED | OUTPATIENT
Start: 2022-02-06 | End: 2022-02-06

## 2022-02-06 RX ADMIN — OXYCODONE HYDROCHLORIDE AND ACETAMINOPHEN 1 TABLET: 10; 325 TABLET ORAL at 12:39

## 2022-02-06 RX ADMIN — KETOROLAC TROMETHAMINE 60 MG: 60 INJECTION, SOLUTION INTRAMUSCULAR at 11:12

## 2022-02-06 NOTE — ED PROVIDER NOTES
Subjective   Patient reports she has been having pain on the right side of her mouth the last molar lower since yesterday.  Broke the tooth yesterday.  Reports the hydrocodone she has at home is not taking care of pain.          Review of Systems   HENT: Positive for dental problem (Lower 3rd molar right broken off and reports painful.). Negative for trouble swallowing.    All other systems reviewed and are negative.      Past Medical History:   Diagnosis Date   • Anemia     Iron deficiency   • Anxiety    • Cancer (HCC)     Stage IIB right breast, [id number]   • Chronic narcotic use     To control restless legs and neuropathic pain.    • Closed displaced fracture of fifth metatarsal bone of right foot 2019   • Depression    • GERD (gastroesophageal reflux disease)    • Hypertension    • Hypothyroid    • Lung nodule    • MERINO (nonalcoholic steatohepatitis)    • MERINO (nonalcoholic steatohepatitis)    • Ovarian abscess     Right   • Peripheral neuropathy    • RLS (restless legs syndrome)    • Splenic artery aneurysm (HCC)     1 CM AS OF 2016   • Tachycardia        No Known Allergies    Past Surgical History:   Procedure Laterality Date   • BREAST AUGMENTATION Bilateral 3/12/2019    Procedure: BREAST REMOVE/REPLACE BREAST IMPLANTS;  Surgeon: Waterhouse, Maurine, MD;  Location: [facility] MAIN OR;  Service: Plastics   •  SECTION      x2 in  and    • COLONOSCOPY N/A 2018    Procedure: COLONOSCOPY TO CECUM & T.I.;  Surgeon: Dong Quinones MD;  Location: [facility] ENDOSCOPY;  Service: Gastroenterology   • ENDOSCOPY N/A 2018    Procedure: ESOPHAGOGASTRODUODENOSCOPY WITH COLD BIOPSIES;  Surgeon: Dong Quinones MD;  Location: [facility] ENDOSCOPY;  Service: Gastroenterology   • HYSTERECTOMY      and bilateral oophorectomy, not finding any malignancy in the surgical specimens of the uterus, cervix, fallopian tubes or ovaries.    • LAPAROSCOPIC SALPINGOOPHERECTOMY Right      Laparoscopic oophorectomy and salpingectomy for ovarian abscess   • MASTECTOMY Right 2012    mastectomy    • ROBOTIC ASSISTED GYNECOLOGIC PROCEDURE  06/2013    For development of abdominal and pelvic pain. She had a hemorrhagic cyst in the left ovary that was removed.        Family History   Problem Relation Age of Onset   • Hypertension Mother    • Atrial fibrillation Son         Required ablatopm   • Breast cancer Neg Hx    • Ovarian cancer Neg Hx    • Cancer Neg Hx    • Malig Hyperthermia Neg Hx        Social History     Socioeconomic History   • Marital status:      Spouse name: Ko   Tobacco Use   • Smoking status: Never Smoker   • Smokeless tobacco: Never Used   Substance and Sexual Activity   • Alcohol use: No   • Drug use: No   • Sexual activity: Yes     Partners: Male     Birth control/protection: None           Objective   Physical Exam  Vitals and nursing note reviewed.   Constitutional:       Appearance: Normal appearance.   HENT:      Nose: Nose normal.      Mouth/Throat:      Mouth: Mucous membranes are moist.     Cardiovascular:      Rate and Rhythm: Normal rate and regular rhythm.      Heart sounds: Normal heart sounds.   Pulmonary:      Effort: Pulmonary effort is normal.      Breath sounds: Normal breath sounds.   Musculoskeletal:      Cervical back: Normal range of motion.   Skin:     General: Skin is warm and dry.   Neurological:      Mental Status: She is alert.         Procedures           ED Course                                                 MDM    Final diagnoses:   Pain due to dental caries       ED Disposition  ED Disposition     ED Disposition Condition Comment    Discharge Stable           Call your dentist for appt 2-7-22.               Medication List      New Prescriptions    cephalexin 500 MG capsule  Commonly known as: KEFLEX  Take 1 capsule by mouth 4 (Four) Times a Day.     diclofenac 50 MG EC tablet  Commonly known as: VOLTAREN  Take 1 tablet by mouth 3 (Three)  Times a Day for 5 days. Take as needed for pain.           Where to Get Your Medications      These medications were sent to [id number] - [place], [place] - [address] AT [facility] - [phone]  - [phone]   [address] [place] [address]    Phone: [phone]   · cephalexin 500 MG capsule  · diclofenac 50 MG EC tablet          Denisse Maloney, APRN  02/07/22 2040

## 2022-05-31 ENCOUNTER — TRANSCRIBE ORDERS (OUTPATIENT)
Dept: ADMINISTRATIVE | Facility: HOSPITAL | Age: 47
End: 2022-05-31

## 2022-05-31 DIAGNOSIS — Z91.89 OTHER SPECIFIED PERSONAL RISK FACTORS, NOT ELSEWHERE CLASSIFIED: Primary | ICD-10-CM

## 2022-06-09 ENCOUNTER — HOSPITAL ENCOUNTER (OUTPATIENT)
Dept: CARDIOLOGY | Facility: HOSPITAL | Age: 47
Discharge: HOME | End: 2022-06-09
Admitting: FAMILY MEDICINE

## 2022-06-09 DIAGNOSIS — Z91.89 OTHER SPECIFIED PERSONAL RISK FACTORS, NOT ELSEWHERE CLASSIFIED: ICD-10-CM

## 2022-06-09 LAB
BH CV LOW VAS LEFT COMMON FEMORAL SPONT: 1
BH CV LOWER VASCULAR LEFT COMMON FEMORAL AUGMENT: NORMAL
BH CV LOWER VASCULAR LEFT COMMON FEMORAL COMPETENT: NORMAL
BH CV LOWER VASCULAR LEFT COMMON FEMORAL COMPRESS: NORMAL
BH CV LOWER VASCULAR LEFT COMMON FEMORAL PHASIC: NORMAL
BH CV LOWER VASCULAR LEFT COMMON FEMORAL SPONT: NORMAL
BH CV LOWER VASCULAR LEFT DISTAL FEMORAL COMPRESS: NORMAL
BH CV LOWER VASCULAR LEFT GASTRONEMIUS COMPRESS: NORMAL
BH CV LOWER VASCULAR LEFT GREATER SAPH AK COMPRESS: NORMAL
BH CV LOWER VASCULAR LEFT GREATER SAPH BK COMPRESS: NORMAL
BH CV LOWER VASCULAR LEFT LESSER SAPH COMPRESS: NORMAL
BH CV LOWER VASCULAR LEFT MID FEMORAL AUGMENT: NORMAL
BH CV LOWER VASCULAR LEFT MID FEMORAL COMPETENT: NORMAL
BH CV LOWER VASCULAR LEFT MID FEMORAL COMPRESS: NORMAL
BH CV LOWER VASCULAR LEFT MID FEMORAL PHASIC: NORMAL
BH CV LOWER VASCULAR LEFT MID FEMORAL SPONT: NORMAL
BH CV LOWER VASCULAR LEFT PERONEAL COMPRESS: NORMAL
BH CV LOWER VASCULAR LEFT POPLITEAL AUGMENT: NORMAL
BH CV LOWER VASCULAR LEFT POPLITEAL COMPETENT: NORMAL
BH CV LOWER VASCULAR LEFT POPLITEAL COMPRESS: NORMAL
BH CV LOWER VASCULAR LEFT POPLITEAL PHASIC: NORMAL
BH CV LOWER VASCULAR LEFT POPLITEAL SPONT: NORMAL
BH CV LOWER VASCULAR LEFT POSTERIOR TIBIAL COMPRESS: NORMAL
BH CV LOWER VASCULAR LEFT PROFUNDA FEMORAL COMPRESS: NORMAL
BH CV LOWER VASCULAR LEFT PROXIMAL FEMORAL COMPRESS: NORMAL
BH CV LOWER VASCULAR LEFT SAPHENOFEMORAL JUNCTION COMPRESS: NORMAL
BH CV LOWER VASCULAR RIGHT COMMON FEMORAL AUGMENT: NORMAL
BH CV LOWER VASCULAR RIGHT COMMON FEMORAL COMPETENT: NORMAL
BH CV LOWER VASCULAR RIGHT COMMON FEMORAL COMPRESS: NORMAL
BH CV LOWER VASCULAR RIGHT COMMON FEMORAL PHASIC: NORMAL
BH CV LOWER VASCULAR RIGHT COMMON FEMORAL SPONT: NORMAL
BH CV LOWER VASCULAR RIGHT DISTAL FEMORAL COMPRESS: NORMAL
BH CV LOWER VASCULAR RIGHT GASTRONEMIUS COMPRESS: NORMAL
BH CV LOWER VASCULAR RIGHT GREATER SAPH AK COMPRESS: NORMAL
BH CV LOWER VASCULAR RIGHT GREATER SAPH BK COMPRESS: NORMAL
BH CV LOWER VASCULAR RIGHT LESSER SAPH COMPRESS: NORMAL
BH CV LOWER VASCULAR RIGHT MID FEMORAL AUGMENT: NORMAL
BH CV LOWER VASCULAR RIGHT MID FEMORAL COMPETENT: NORMAL
BH CV LOWER VASCULAR RIGHT MID FEMORAL COMPRESS: NORMAL
BH CV LOWER VASCULAR RIGHT MID FEMORAL PHASIC: NORMAL
BH CV LOWER VASCULAR RIGHT MID FEMORAL SPONT: NORMAL
BH CV LOWER VASCULAR RIGHT PERONEAL COMPRESS: NORMAL
BH CV LOWER VASCULAR RIGHT POPLITEAL AUGMENT: NORMAL
BH CV LOWER VASCULAR RIGHT POPLITEAL COMPETENT: NORMAL
BH CV LOWER VASCULAR RIGHT POPLITEAL COMPRESS: NORMAL
BH CV LOWER VASCULAR RIGHT POPLITEAL PHASIC: NORMAL
BH CV LOWER VASCULAR RIGHT POPLITEAL SPONT: NORMAL
BH CV LOWER VASCULAR RIGHT POSTERIOR TIBIAL COMPRESS: NORMAL
BH CV LOWER VASCULAR RIGHT PROFUNDA FEMORAL COMPRESS: NORMAL
BH CV LOWER VASCULAR RIGHT PROXIMAL FEMORAL COMPRESS: NORMAL
BH CV LOWER VASCULAR RIGHT SAPHENOFEMORAL JUNCTION COMPRESS: NORMAL
MAXIMAL PREDICTED HEART RATE: 171 BPM
STRESS TARGET HR: 145 BPM

## 2022-06-09 PROCEDURE — 93970 EXTREMITY STUDY: CPT

## 2022-09-26 ENCOUNTER — IMMUNIZATION (OUTPATIENT)
Dept: VACCINE CLINIC | Facility: HOSPITAL | Age: 47
End: 2022-09-26

## 2022-09-26 DIAGNOSIS — Z23 NEED FOR VACCINATION: Primary | ICD-10-CM

## 2022-09-26 PROCEDURE — 91312 HC SARSCOV2 VAC 30MCG/0.3ML IM BIVALENT BOOSTER 12 YRS AND OLDER: CPT | Performed by: INTERNAL MEDICINE

## 2022-09-26 PROCEDURE — 0124A: CPT | Performed by: INTERNAL MEDICINE

## 2024-09-09 DIAGNOSIS — I10 ESSENTIAL (PRIMARY) HYPERTENSION: ICD-10-CM

## 2024-09-09 RX ORDER — ATENOLOL 25 MG/1
25 TABLET ORAL DAILY
Qty: 90 TABLET | Refills: 3 | Status: SHIPPED | OUTPATIENT
Start: 2024-09-09 | End: 2024-09-11

## 2024-09-11 DIAGNOSIS — I10 ESSENTIAL (PRIMARY) HYPERTENSION: ICD-10-CM

## 2024-09-11 RX ORDER — ATENOLOL 25 MG/1
25 TABLET ORAL DAILY
Qty: 90 TABLET | Refills: 3 | Status: SHIPPED | OUTPATIENT
Start: 2024-09-11

## 2024-10-25 ENCOUNTER — OFFICE VISIT (OUTPATIENT)
Age: 49
End: 2024-10-25
Payer: COMMERCIAL

## 2024-10-25 VITALS
WEIGHT: 155 LBS | OXYGEN SATURATION: 98 % | TEMPERATURE: 98.3 F | BODY MASS INDEX: 26.46 KG/M2 | DIASTOLIC BLOOD PRESSURE: 70 MMHG | HEIGHT: 64 IN | RESPIRATION RATE: 17 BRPM | HEART RATE: 77 BPM | SYSTOLIC BLOOD PRESSURE: 110 MMHG

## 2024-10-25 DIAGNOSIS — H65.193 ACUTE EFFUSION OF BOTH MIDDLE EARS: Primary | ICD-10-CM

## 2024-10-25 PROCEDURE — 90471 IMMUNIZATION ADMIN: CPT

## 2024-10-25 PROCEDURE — 99213 OFFICE O/P EST LOW 20 MIN: CPT

## 2024-10-25 PROCEDURE — 90656 IIV3 VACC NO PRSV 0.5 ML IM: CPT

## 2024-10-25 RX ORDER — AZITHROMYCIN 250 MG/1
TABLET, FILM COATED ORAL
Qty: 6 TABLET | Refills: 0 | Status: SHIPPED | OUTPATIENT
Start: 2024-10-25

## 2024-10-25 RX ORDER — FLUTICASONE PROPIONATE 50 UG/1
2 SPRAY, METERED NASAL DAILY
Qty: 16 G | Refills: 3 | Status: SHIPPED | OUTPATIENT
Start: 2024-10-25

## 2024-10-27 NOTE — PROGRESS NOTES
"Chief Complaint  Sore Throat and Earache (Just the left side)    Subjective        Rosana Griffin presents to [facility]  Sore Throat   Associated symptoms include ear pain.   Earache   Associated symptoms include a sore throat.       History of Present Illness  The patient presents for evaluation of left ear pain and sore throat.    She reports experiencing sharp pain in her left ear and a sore throat on the same side, which began yesterday. She is uncertain if these symptoms are related to her being exposed to extreme cold at work. She has not had any recent exposure to sick individuals. Additionally, she mentions that she has not yet received her influenza vaccine.    She is currently on lisinopril and atenolol for blood pressure management. She is not taking amlodipine. Her medications also include Tenormin 25, Cymbalta 20, Fentanyl patch, and oxycodone. Dr. Wagner is managing her Fentanyl patch and oxycodone. She has expressed interest in a nerve stimulator, but her oncologist does not support this option. She has not considered radioablation yet.    ALLERGIES  She has no known drug allergies.       Objective   Vital Signs:  /70 (BP Location: Left arm, Patient Position: Sitting, Cuff Size: Adult)   Pulse 77   Temp 98.3 °F (36.8 °C) (Oral)   Resp 17   Ht 162.6 cm (64.02\")   Wt 70.3 kg (155 lb)   SpO2 98%   BMI 26.59 kg/m²   Estimated body mass index is 26.59 kg/m² as calculated from the following:    Height as of this encounter: 162.6 cm (64.02\").    Weight as of this encounter: 70.3 kg (155 lb).          Review of Systems   HENT:  Positive for ear pain and sore throat.       Physical Exam  Constitutional:       General: She is not in acute distress.     Appearance: Normal appearance. She is normal weight. She is not toxic-appearing.   HENT:      Head: Normocephalic and atraumatic.      Right Ear: Tympanic membrane, ear canal and external ear normal. A middle ear " effusion is present. There is no impacted cerumen. Tympanic membrane is erythematous.      Left Ear: Ear canal and external ear normal. A middle ear effusion is present. There is no impacted cerumen. Tympanic membrane is not erythematous.      Nose: Nose normal. No congestion or rhinorrhea.      Mouth/Throat:      Mouth: Mucous membranes are moist.      Tongue: No lesions. Tongue does not deviate from midline.      Pharynx: Oropharynx is clear. No pharyngeal swelling, oropharyngeal exudate, posterior oropharyngeal erythema or uvula swelling.      Tonsils: No tonsillar exudate or tonsillar abscesses.   Eyes:      General: Lids are normal. Vision grossly intact. Gaze aligned appropriately. No scleral icterus.     Extraocular Movements: Extraocular movements intact.      Conjunctiva/sclera: Conjunctivae normal.      Pupils: Pupils are equal, round, and reactive to light.   Neck:      Vascular: No carotid bruit.   Cardiovascular:      Rate and Rhythm: Normal rate and regular rhythm.      Pulses: Normal pulses.      Heart sounds: Normal heart sounds. No murmur heard.     No friction rub. No gallop.   Pulmonary:      Effort: Pulmonary effort is normal. No respiratory distress.      Breath sounds: Normal breath sounds. No stridor. No wheezing, rhonchi or rales.   Chest:      Chest wall: No tenderness.   Abdominal:      General: Abdomen is flat. There is no distension.      Palpations: Abdomen is soft. There is no mass.      Tenderness: There is no abdominal tenderness. There is no right CVA tenderness, left CVA tenderness, guarding or rebound.      Hernia: No hernia is present.   Musculoskeletal:         General: Normal range of motion.      Cervical back: No rigidity or tenderness.      Right lower leg: No edema.      Left lower leg: No edema.   Lymphadenopathy:      Cervical: No cervical adenopathy.   Skin:     General: Skin is warm and dry.      Capillary Refill: Capillary refill takes less than 2 seconds.       Coloration: Skin is not jaundiced or pale.      Findings: No bruising, erythema or lesion.   Neurological:      General: No focal deficit present.      Mental Status: She is alert and oriented to person, place, and time. Mental status is at baseline.   Psychiatric:         Mood and Affect: Mood normal.         Behavior: Behavior normal. Behavior is cooperative.         Thought Content: Thought content normal.         Judgment: Judgment normal.        Result Review :          Results                Assessment and Plan   Diagnoses and all orders for this visit:    1. Acute effusion of both middle ears (Primary)  -     azithromycin (Zithromax Z-Cassius) 250 MG tablet; Take 2 tablets by mouth on day 1, then 1 tablet daily on days 2-5  Dispense: 6 tablet; Refill: 0  -     fluticasone (FLONASE) 50 MCG/ACT nasal spray; Administer 2 sprays into the nostril(s) as directed by provider Daily.  Dispense: 16 g; Refill: 3  -     Basic Metabolic Panel  -     Hemoglobin A1c  -     CBC & Differential  -     Hepatic Function Panel (6) ([facility])  -     TSH    Other orders  -     Fluzone >6mos ([id number])        Assessment & Plan   Left ear pain.  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 Examination revealed effusion bilaterally, with the left ear being red and inflamed. A Z-Cassius was prescribed to address the potential infection. Flonase was recommended to help with the ear effusions, with instructions to use the spray gently and perform the Valsalva maneuver to alleviate discomfort. Three refills of Flonase were provided.    Sore throat.  The sore throat on the left side is likely related to the same cause as the left ear pain. The prescribed Z-Cassius should help address this symptom as well warm salt water gargles.      Hypertension.  The patient's blood pressure is well-controlled at 110/70 with the current regimen of lisinopril and  atenolol. She is not taking amlodipine. Continue current treatment.      Pain Management.  The patient is considering a nerve stimulator but is hesitant due to mixed opinions. Radiofrequency ablation was discussed as an alternative option. She was advised to research Suboxone as a potential method to decrease opioid use.    6. Health Maintenance.  Health maintenance labs were ordered, excluding lipids due to the patient not fasting. An influenza vaccine was administered during the visit.               Follow Up   No follow-ups on file.  Patient was given instructions and counseling regarding her condition or for health maintenance advice. Please see specific information pulled into the AVS if appropriate.         Patient or patient representative verbalized consent for the use of Ambient Listening during the visit with  ETHEL Morel for chart documentation. 10/27/2024  14:56 EDT

## 2024-12-02 DIAGNOSIS — I10 ESSENTIAL (PRIMARY) HYPERTENSION: ICD-10-CM

## 2024-12-02 RX ORDER — LISINOPRIL 10 MG/1
10 TABLET ORAL DAILY
Qty: 90 TABLET | Refills: 3 | Status: SHIPPED | OUTPATIENT
Start: 2024-12-02 | End: 2024-12-04

## 2024-12-04 DIAGNOSIS — I10 ESSENTIAL (PRIMARY) HYPERTENSION: ICD-10-CM

## 2024-12-04 RX ORDER — LISINOPRIL 10 MG/1
10 TABLET ORAL DAILY
Qty: 90 TABLET | Refills: 3 | Status: SHIPPED | OUTPATIENT
Start: 2024-12-04

## 2025-02-17 ENCOUNTER — TELEPHONE (OUTPATIENT)
Age: 50
End: 2025-02-17
Payer: COMMERCIAL

## 2025-02-17 NOTE — TELEPHONE ENCOUNTER
Called Pt back after her referral told her Matt would put one in and someone will be in touch to get her scheduled.

## 2025-02-17 NOTE — TELEPHONE ENCOUNTER
Caller: Rosana Griffin    Relationship to patient: Self    Best call back number:     [phone]       Patient is needing:   PATIENT'S LEFT BREAST IMPLANT IS DROPPING. SHE CALLED HER PLASTIC SURGEON AND THEY PROCEEDED TO TELL HER THAT THEIR OFFICE IS NO LONGER ACCEPTING PATIENTS WITH HEALTH INSURANCE.    THEY TOLD HER TO SPEAK TO PRIMARY CARE DOCTORS TO REFER A PLASTIC SURGEON. SHE IS UNAWARE IF IT IS MORE URGENT, OR IF PHILIP WOULD LIKE TO SEE HER.    PLEASE CALL TO DISCUSS, OR SCHEDULE, OR DISCUSS A REFERRAL. PATIENT WILL FOLLOW YOUR DIRECTION.

## (undated) DEVICE — DRSNG SURESITE WNDW 4X4.5

## (undated) DEVICE — BNDG ELAS ELITE V/CLOSE 6IN 5YD LF STRL

## (undated) DEVICE — STRIP CLS WND SUTURESTRIP/PLS 0.5X5IN TP1105

## (undated) DEVICE — DEV DEL BRST/IMP GEL KELLER2 FUNNEL

## (undated) DEVICE — STRIP CLS WND SUTURESTRIP/PLS 0.5X4IN TP1103

## (undated) DEVICE — TUBING, SUCTION, 1/4" X 10', STRAIGHT: Brand: MEDLINE

## (undated) DEVICE — INTENDED FOR TISSUE SEPARATION, AND OTHER PROCEDURES THAT REQUIRE A SHARP SURGICAL BLADE TO PUNCTURE OR CUT.: Brand: BARD-PARKER ® CARBON RIB-BACK BLADES

## (undated) DEVICE — THE TORRENT IRRIGATION SCOPE CONNECTOR IS USED WITH THE TORRENT IRRIGATION TUBING TO PROVIDE IRRIGATION FLUIDS SUCH AS STERILE WATER DURING GASTROINTESTINAL ENDOSCOPIC PROCEDURES WHEN USED IN CONJUNCTION WITH AN IRRIGATION PUMP (OR ELECTROSURGICAL UNIT).: Brand: TORRENT

## (undated) DEVICE — CANN NASL CO2 TRULINK W/O2 A/

## (undated) DEVICE — FRCP BX RADJAW4 NDL 2.8 240CM LG OG BX40

## (undated) DEVICE — SUT VIC 3/0 PS2 27IN J427H

## (undated) DEVICE — GLV SURG BIOGEL LTX PF 6 1/2

## (undated) DEVICE — SKIN PREP TRAY W/CHG: Brand: MEDLINE INDUSTRIES, INC.

## (undated) DEVICE — BITEBLOCK OMNI BLOC

## (undated) DEVICE — Device: Brand: DEFENDO AIR/WATER/SUCTION AND BIOPSY VALVE

## (undated) DEVICE — IRRIGATOR BULB ASEPTO 60CC STRL

## (undated) DEVICE — NDL HYPO PRECISIONGLIDE REG 25G 1 1/2

## (undated) DEVICE — BANDAGE,GAUZE,BULKEE II,4.5"X4.1YD,STRL: Brand: MEDLINE

## (undated) DEVICE — NDL SPINE 22G 31/2IN BLK

## (undated) DEVICE — GAUZE,SPONGE,FLUFF,6"X6.75",STRL,10/TRAY: Brand: MEDLINE

## (undated) DEVICE — ANTIBACTERIAL UNDYED BRAIDED (POLYGLACTIN 910), SYNTHETIC ABSORBABLE SUTURE: Brand: COATED VICRYL

## (undated) DEVICE — PK UNIV COMPL 40

## (undated) DEVICE — SPNG GZ WOVN 4X4IN 12PLY 10/BX STRL

## (undated) DEVICE — 3M™ STERI-STRIP™ COMPOUND BENZOIN TINCTURE 40 BAGS/CARTON 4 CARTONS/CASE C1544: Brand: 3M™ STERI-STRIP™

## (undated) DEVICE — SUT VIC 4/0 P3 18IN J494G

## (undated) DEVICE — ELECTRD BLD EXT EDGE 1P COAT 6.5IN STRL

## (undated) DEVICE — TOWEL,OR,DSP,ST,BLUE,STD,4/PK,20PK/CS: Brand: MEDLINE

## (undated) DEVICE — SUT PDS 2 0 CT1 27IN CLR Z259H

## (undated) DEVICE — SYR LUERLOK 30CC